# Patient Record
Sex: FEMALE | Race: WHITE | Employment: OTHER | URBAN - METROPOLITAN AREA
[De-identification: names, ages, dates, MRNs, and addresses within clinical notes are randomized per-mention and may not be internally consistent; named-entity substitution may affect disease eponyms.]

---

## 2017-01-01 ENCOUNTER — HOSPITAL ENCOUNTER (OUTPATIENT)
Dept: CARDIAC REHAB | Facility: CLINIC | Age: 72
End: 2017-01-19
Attending: INTERNAL MEDICINE
Payer: MEDICARE

## 2017-01-01 ENCOUNTER — HOSPITAL ENCOUNTER (OUTPATIENT)
Dept: CARDIAC REHAB | Facility: CLINIC | Age: 72
End: 2017-02-14
Attending: INTERNAL MEDICINE
Payer: MEDICARE

## 2017-01-01 ENCOUNTER — HOSPITAL ENCOUNTER (OUTPATIENT)
Dept: CARDIAC REHAB | Facility: CLINIC | Age: 72
End: 2017-01-10
Attending: INTERNAL MEDICINE
Payer: MEDICARE

## 2017-01-01 ENCOUNTER — OFFICE VISIT (OUTPATIENT)
Dept: CARDIOLOGY | Facility: CLINIC | Age: 72
End: 2017-01-01
Payer: COMMERCIAL

## 2017-01-01 ENCOUNTER — HOSPITAL ENCOUNTER (OUTPATIENT)
Dept: CARDIAC REHAB | Facility: CLINIC | Age: 72
End: 2017-02-07
Attending: INTERNAL MEDICINE
Payer: MEDICARE

## 2017-01-01 ENCOUNTER — TRANSFERRED RECORDS (OUTPATIENT)
Dept: HEALTH INFORMATION MANAGEMENT | Facility: CLINIC | Age: 72
End: 2017-01-01

## 2017-01-01 ENCOUNTER — HOSPITAL ENCOUNTER (OUTPATIENT)
Dept: CARDIAC REHAB | Facility: CLINIC | Age: 72
End: 2017-01-17
Attending: INTERNAL MEDICINE
Payer: MEDICARE

## 2017-01-01 ENCOUNTER — PRE VISIT (OUTPATIENT)
Dept: CARDIOLOGY | Facility: CLINIC | Age: 72
End: 2017-01-01

## 2017-01-01 ENCOUNTER — HOSPITAL ENCOUNTER (OUTPATIENT)
Dept: CARDIAC REHAB | Facility: CLINIC | Age: 72
End: 2017-02-03
Attending: INTERNAL MEDICINE
Payer: MEDICARE

## 2017-01-01 ENCOUNTER — HOSPITAL ENCOUNTER (OUTPATIENT)
Dept: CARDIAC REHAB | Facility: CLINIC | Age: 72
End: 2017-02-09
Attending: INTERNAL MEDICINE
Payer: MEDICARE

## 2017-01-01 ENCOUNTER — HOSPITAL ENCOUNTER (OUTPATIENT)
Dept: CARDIAC REHAB | Facility: CLINIC | Age: 72
End: 2017-03-13
Attending: INTERNAL MEDICINE
Payer: MEDICARE

## 2017-01-01 ENCOUNTER — HOSPITAL ENCOUNTER (OUTPATIENT)
Dept: CARDIAC REHAB | Facility: CLINIC | Age: 72
End: 2017-02-21
Attending: INTERNAL MEDICINE
Payer: MEDICARE

## 2017-01-01 ENCOUNTER — HOSPITAL ENCOUNTER (OUTPATIENT)
Dept: CARDIAC REHAB | Facility: CLINIC | Age: 72
End: 2017-03-02
Attending: INTERNAL MEDICINE
Payer: MEDICARE

## 2017-01-01 ENCOUNTER — HOSPITAL ENCOUNTER (OUTPATIENT)
Dept: CARDIAC REHAB | Facility: CLINIC | Age: 72
End: 2017-01-26
Attending: INTERNAL MEDICINE
Payer: MEDICARE

## 2017-01-01 ENCOUNTER — HOSPITAL ENCOUNTER (OUTPATIENT)
Dept: CARDIAC REHAB | Facility: CLINIC | Age: 72
End: 2017-02-16
Attending: INTERNAL MEDICINE
Payer: MEDICARE

## 2017-01-01 ENCOUNTER — HOSPITAL ENCOUNTER (OUTPATIENT)
Dept: CARDIAC REHAB | Facility: CLINIC | Age: 72
End: 2017-02-23
Attending: INTERNAL MEDICINE
Payer: MEDICARE

## 2017-01-01 ENCOUNTER — HOSPITAL ENCOUNTER (OUTPATIENT)
Dept: CARDIAC REHAB | Facility: CLINIC | Age: 72
End: 2017-01-30
Attending: INTERNAL MEDICINE
Payer: MEDICARE

## 2017-01-01 ENCOUNTER — HOSPITAL ENCOUNTER (OUTPATIENT)
Dept: CARDIAC REHAB | Facility: CLINIC | Age: 72
End: 2017-01-12
Attending: INTERNAL MEDICINE
Payer: MEDICARE

## 2017-01-01 VITALS
DIASTOLIC BLOOD PRESSURE: 80 MMHG | BODY MASS INDEX: 46.42 KG/M2 | SYSTOLIC BLOOD PRESSURE: 128 MMHG | HEART RATE: 54 BPM | HEIGHT: 65 IN | WEIGHT: 278.6 LBS

## 2017-01-01 VITALS — BODY MASS INDEX: 45.93 KG/M2 | WEIGHT: 276 LBS

## 2017-01-01 VITALS — WEIGHT: 276 LBS | BODY MASS INDEX: 45.93 KG/M2

## 2017-01-01 VITALS — BODY MASS INDEX: 45.82 KG/M2 | HEIGHT: 65 IN | WEIGHT: 275 LBS

## 2017-01-01 VITALS — BODY MASS INDEX: 46.1 KG/M2 | WEIGHT: 277 LBS

## 2017-01-01 VITALS — BODY MASS INDEX: 46.26 KG/M2 | WEIGHT: 278 LBS

## 2017-01-01 VITALS — BODY MASS INDEX: 45.98 KG/M2 | WEIGHT: 276 LBS | HEIGHT: 65 IN

## 2017-01-01 VITALS — BODY MASS INDEX: 46.32 KG/M2 | HEIGHT: 65 IN | WEIGHT: 278 LBS

## 2017-01-01 VITALS — BODY MASS INDEX: 45.6 KG/M2 | WEIGHT: 274 LBS

## 2017-01-01 VITALS — WEIGHT: 273 LBS | BODY MASS INDEX: 45.43 KG/M2

## 2017-01-01 DIAGNOSIS — R00.1 BRADYCARDIA: Primary | ICD-10-CM

## 2017-01-01 PROCEDURE — G0424 PULMONARY REHAB W EXER: HCPCS

## 2017-01-01 PROCEDURE — 40000244 ZZH STATISTIC VISIT PULM REHAB

## 2017-01-01 PROCEDURE — G0424 PULMONARY REHAB W EXER: HCPCS | Performed by: REHABILITATION PRACTITIONER

## 2017-01-01 PROCEDURE — G0239 OTH RESP PROC, GROUP: HCPCS

## 2017-01-01 PROCEDURE — 40000244 ZZH STATISTIC VISIT PULM REHAB: Performed by: REHABILITATION PRACTITIONER

## 2017-01-01 PROCEDURE — 99213 OFFICE O/P EST LOW 20 MIN: CPT | Performed by: INTERNAL MEDICINE

## 2017-01-01 RX ORDER — BUPROPION HYDROCHLORIDE 300 MG/1
300 TABLET ORAL EVERY MORNING
COMMUNITY

## 2017-01-01 RX ORDER — OXYBUTYNIN CHLORIDE 10 MG/1
10 TABLET, EXTENDED RELEASE ORAL DAILY
COMMUNITY

## 2017-01-01 ASSESSMENT — 6 MINUTE WALK TEST (6MWT)
FEMALE CALC: 311.91
TOTAL DISTANCE WALKED: 227.74
TOTAL DISTANCE WALKED: 227.74
MALE CALC: 356.97
GENDER SELECTION: FEMALE
FEMALE CALC: 309.83
FEMALE CALC: 312.95
TOTAL DISTANCE WALKED: 227.74
GENDER SELECTION: FEMALE
MALE CALC: 359.37
GENDER SELECTION: FEMALE
TOTAL DISTANCE WALKED: 255.48
MALE CALC: 358.57

## 2017-01-01 ASSESSMENT — PULMONARY FUNCTION TESTS
FEV1 (%PREDICTED): 53
FVC: 1.77
FEV1/FVC: 76
FEV1 (%PREDICTED): 53
FVC: 1.77
FVC_PERCENT_PREDICTED: 58
FVC: 1.77
FEV1: 1.22
FEV1 (%PREDICTED): 53

## 2017-01-01 ASSESSMENT — ACTIVITIES OF DAILY LIVING (ADL)
ADL_LIMITATIONS: DRESSING;BATHING

## 2017-01-03 ENCOUNTER — HOSPITAL ENCOUNTER (OUTPATIENT)
Dept: CARDIAC REHAB | Facility: CLINIC | Age: 72
End: 2017-01-03
Attending: INTERNAL MEDICINE
Payer: MEDICARE

## 2017-01-03 VITALS — BODY MASS INDEX: 45.26 KG/M2 | WEIGHT: 272 LBS

## 2017-01-03 PROCEDURE — 40000244 ZZH STATISTIC VISIT PULM REHAB

## 2017-01-03 PROCEDURE — G0424 PULMONARY REHAB W EXER: HCPCS

## 2017-01-03 ASSESSMENT — PULMONARY FUNCTION TESTS
FEV1: 1.22
FVC: 1.77
FEV1 (%PREDICTED): 53
FVC_PERCENT_PREDICTED: 58
FEV1/FVC: 76

## 2017-01-03 ASSESSMENT — 6 MINUTE WALK TEST (6MWT)
TOTAL DISTANCE WALKED: 227.74
GENDER SELECTION: FEMALE

## 2017-01-03 ASSESSMENT — ACTIVITIES OF DAILY LIVING (ADL): ADL_LIMITATIONS: DRESSING;BATHING

## 2017-01-03 NOTE — PROGRESS NOTES
Pat CAMILA Tera  1945  71 year old   01/03/17 1100   Session   Session 30 Day Individualized Treatment Plan  (ITP forwarded for medical director review. Pt started 12/26)   Certified through this date 02/03/17   Type Reassessment   General Information   Treatment Diagnosis COPD   Secondary Treatment Diagnosis Lung Cancer   Classification of COPD Stage 2 (Moderate)   Onset Date 12/26/13   Outpatient Pulmonary Rehab Start Date 12/26/16   Primary Physician Sommer He MD   Pulmonolgist MELI Spaulding MD   Medical History/Comorbidities   Medical History/Comorbidities Hypertension;GERD;Obstructive sleep apnea;Depression;Anxiety   Medical History Comments .m   Untoward Events/Exacerbations/Hospitalizations   Untoward Events/Exacerbations/Hospitalizations None   Sputum   Sputum Production Amount AM post nasal   Sputum Production Color Clear;Yellow   Sputum Production Consistency Thick   Tobacco History   Tobacco Former smoker   Tobacco Habit Cigarettes   Years Smoked 25   Average Packs Per Day 2.5   Quit Date or Planned Quit Date 01/01/87   Interventions Planned None: Patient is in maintenance   Medications   Long-Acting Beta Agonist Prescribed, taking as prescribed  (Anoro Ellipta (Trial))   Short-Acting Beta Agonist Prescribed, taking as prescribed  (Albuterol)   Long-Acting Anticholinergic Prescribed, taking as prescribed  (Anora Ellipta)   Short-Acting Anticholinergic Not prescribed   Inhaled Corticosteroid Not prescribed   Oral Corticosteroid Not prescribed   Pain   Patient Currently in Pain Yes   Pain Location Right knee and left ankle   Pain Rating 3/10   Pain Description Sharp   Falls Screen   Have you fallen two or more times in the past year? Yes   Have you fallen and had an injury in the past year? Yes  (right shoulder and knee)   Comment uses cane   Living and Work Status   Living Arrangements [facility]   Support System Live alone   Environmental Factors No concerns   ADL Limitations Dressing;Bathing  "  Occupation caregiver   Return to Employment No   Physical Assessments   Edema +1 Trace   Left Lung Sounds diminished   Right Lung Sounds diminished   Pulmonary Function Test (PFTs)   PFT Results Available   Date Completed 12/07/16   FVC Actual 1.77   % Predicted FVC 58   FEV1 Actual 1.22   % Predicted FEV1 53   FEV1/FEV Ratio 76   FRC Actual 69   Uncorrected DLCO 12.5   % Predicted Uncorrected DLCO 47   Pre/Post Bronchodilator Pre   Airway Obstruction Moderate   Individualized Treatment Plan   Sessions Scheduled 18   Oxygen Use   Supplemental Oxygen Needed No   Exercise Prescription   Mode Ambulation;Weights;Nustep   Frequency 2 days/week   Duration/Time Intermittent bouts   THR (85% of age predicted max heart rate)  125.8   Effort Rating (0-10) 4-7/10   Progression of Exercise Start with bouts of exercise up to 15\" then add 0.25 mets/week   Exercise Assessment   6 Minute Walk Predicted - Gender Selection Female   6 Minute Walk Distance (Initial) 227.74 Meters   Resting HR 53   Resting /60   Exercise /90   SpO2 RA   Exercise SpO2 91   Exercise Tolerance poor   Normal Limits Discussed No   Current Symptoms at Home Dyspnea;Fatigue;Joint pain   Nutrition Management   Age 71   Assessment Overweight   Goal weight (not discussed,  would like her to lose wt)   Interventions Planned Attend group nutrition class   Psychosocial   Initial Patient Health Questionnaire -9 (PHQ-9) for depression. To notify physician if pre-score >9. 12   Initial [facility] Survey score. A quality of life measure. To re evaluate if total score is > 25. Also consider PHQ-9 and DASI to determine if patient should be referred back to physician. 28   Initial Shortness of Breath Questionnaire (SOBQ) score. The goal is to reduce the score pre to post program. 68   Intervention Planned Patient to identify 2-3 coping mechanisms to decrease stress and anxiety;Patient to attend appropriate education class;Develop and implement coping " techniques;Recognize signs and symptoms of depression;Introduce relaxation techniques to assist with decreased anxiety;Use breathing techniques to control dyspnea cycle   Psychosocial Comments 12/26/16 Pt is on Prozac and feels well managed   Stages of Change   Aerobic Exercise Preparation   Physical Activity Preparation   Recommended diet Preparation   Stress Preparation   Smoking Cessation Maintenance   Oxygen Usage NA   Current Home Exercise   Type of Exercise None   Recommended Home Exercise Prescription   Type of Exercise Walking   Frequency (Days per week) 1-2   Duration (minutes per session) Intermittent   Effort Rating Recommended (0-10) Scale  4-6/10   30 Day Exercise Plan 12/26/16 Pt does not have any idea what she can do at home for exercise.   Learning Assessment   Learner Patient   Primary Language English   Preferred Learning Style Listening;Reading;Demonstration   Barriers to Learning No barriers noted   Patient Education/Referrals   Education Recommended Activities of Daily Living;Air Quality;Airway Clearance;Anatomy and Disease Process;Breathing Techniques;Community Resources/Exacerbation Management;Emotional Aspects of CLD;Energy Conservation;Exercise Principles;Medication Overview;Nutrition;Panic and Anxiety   Pulmonary Rehab Goals   Pulmonary Rehab Goals 1;2;3   Goal 1   Goal Pt to walk further with out stopping for resp symptoms by starting an appropriate exercise program that she can also start in the home enviroment.    Target Date 02/26/17   Goal 2   Goal Pt to learn more about her respiratory condition by attending all of the education offered.   Target Date 02/26/17   Goal 3   Goal Pt to learn breathing techniques to better manage her SOB with ADL's.   Target Date 02/26/17   Assessment   Assessment Pt is a 71 year old  with onset of dyspnea worse 3 months ago and currently assessed by a pulmonologist and found to have Stage II COPD. Also has a hx of squamous cell CA with LL lobectomy with  ongoing surveillance. EF of 55-60% and no CAD. Pt wears a CPAP for her NICOLE.She would like to be able to walk further and gain endurance by attending SD.   .Physician cosignature/electronic signature indicates approval of this ITP document. I have established, reviewed and made necessary changes to the individualized treatment plan and exercise prescription for this patient.

## 2017-01-05 ENCOUNTER — HOSPITAL ENCOUNTER (OUTPATIENT)
Dept: CARDIAC REHAB | Facility: CLINIC | Age: 72
End: 2017-01-05
Attending: INTERNAL MEDICINE
Payer: MEDICARE

## 2017-01-05 VITALS — WEIGHT: 271 LBS | BODY MASS INDEX: 45.1 KG/M2

## 2017-01-05 PROCEDURE — 40000244 ZZH STATISTIC VISIT PULM REHAB: Performed by: REHABILITATION PRACTITIONER

## 2017-01-05 PROCEDURE — G0424 PULMONARY REHAB W EXER: HCPCS | Performed by: REHABILITATION PRACTITIONER

## 2017-01-05 NOTE — PROGRESS NOTES
01/05/17 1000   Sessions   Session number 3   Sessions Scheduled 18   Resting Vital Signs   Resting Heart Rate 56   Resting /70 mmHg   Dyspnea at Rest? no   SpO2 95   FiO2 RA   Weight 122.925 kg (271 lb)   Exercise Vital Signs   Modalities Ambulation   HR 93   SPO2 92   FiO2 RA   Effort Rating 0-10 5.5   Shortness of Breath Rating 0-10 5   Pain Assessment   Does patient have pain? No   Exercise Modalities   Quick Picks Ambulation;Nu Step Q;Treadmill;Weights   Treadmill   Duration 13 Minutes   Speed 0.8   Grade 0   Workload (METS) 1.61   Weights   Upper Extremity: (pounds/reps) 1/10   Lower Extremity: (pounds/reps) 1/10   Nustep   Exercise Duration 15   Resistance/Level 2   Workload (METS) 1.7   Ambulation   Duration (Minutes) 5   Cool Down   Duration 5   Assessment   Current Symptoms in Rehab Dyspnea;Fatigue   Current Complaints/Pain Dyspnea, fatigue   Assessment / Progress Pt completed TUG test today for assessment of balance. She also attended class on pyschosocial aspects of CLB and living will.    Tolerance Exercise goals met today   Plan Increase time on TM to 15 mins (taking breaks as needed).    Comments/1:1 Intervention 1:1 face time with Dr. Cruz   Supervising Physician Dr. Cruz   Total Session Time (Minutes) 60 min group exercise including rest breaks in between modalities, 60 min education on psychosocial aspects of CLD.    I have personally seen this patient today. The patient reports not eating good breakfasts. The patient is currently participating in Pulmonary Rehabilitation, and demonstrates stable pulmonary response to exercise, making appropriate progress toward goals.  Barriers to progress are ankle and weight issues. I agree with this individual treatment plan and exercise prescription  See individual treatment plan for details of progress and plan.

## 2017-01-26 NOTE — PROGRESS NOTES
Pat Haley  1945  71 year old   01/26/17 1400   Session   Session 60 Day Individualized Treatment Plan   Certified through this date 03/04/17   Type Reassessment   General Information   Treatment Diagnosis COPD   Secondary Treatment Diagnosis Lung Cancer   Classification of COPD Stage 2 (Moderate)   Onset Date 12/26/13   Current Signs and Symptoms Fatigue   Outpatient Pulmonary Rehab Start Date 12/26/16   Primary Physician Sommer He MD   Pulmonolgist MELI Spaulding MD   Medical History/Comorbidities   Medical History/Comorbidities Hypertension;GERD;Obstructive sleep apnea;Depression;Anxiety   Medical History Comments .m   Untoward Events/Exacerbations/Hospitalizations   Untoward Events/Exacerbations/Hospitalizations None   Sputum   Sputum Production Amount AM post nasal   Sputum Production Color Clear;Yellow   Sputum Production Consistency Thick   Tobacco History   Tobacco Former smoker   Tobacco Habit Cigarettes   Years Smoked 25   Average Packs Per Day 2.5   Quit Date or Planned Quit Date 01/01/87   Interventions Planned None: Patient is in maintenance   Medications   Long-Acting Beta Agonist Prescribed, taking as prescribed  (Anoro Ellipta (Trial))   Short-Acting Beta Agonist Prescribed, taking as prescribed  (Albuterol)   Long-Acting Anticholinergic Prescribed, taking as prescribed  (Anora Ellipta)   Short-Acting Anticholinergic Not prescribed   Inhaled Corticosteroid Not prescribed   Oral Corticosteroid Not prescribed   Medications Reconciled By Patient   Preventative Vaccinations   Influenza Vaccination Yes   Pneumonia Vaccination Yes   Pain   Patient Currently in Pain No   Falls Screen   Have you fallen two or more times in the past year? Yes   Have you fallen and had an injury in the past year? Yes  (right shoulder and knee)   Referral initiated to physical therapy No   Comment 1/26 Pt now stable with use of her cane. Referral to PT not needed at this time.    Living and Work Status   Living  "Arrangements [facility]   Support System Live alone   Environmental Factors No concerns   ADL Limitations Dressing;Bathing   Occupation caregiver   Return to Employment No   Physical Assessments   Incisions Not applicable   Edema +1 Trace   Left Lung Sounds diminished   Right Lung Sounds diminished   Pulmonary Function Test (PFTs)   PFT Results Available   Date Completed 12/07/16   FVC Actual 1.77   % Predicted FVC 58   FEV1 Actual 1.22   % Predicted FEV1 53   FEV1/FEV Ratio 76   FRC Actual 69   Uncorrected DLCO 12.5   % Predicted Uncorrected DLCO 47   Pre/Post Bronchodilator Pre   Airway Obstruction Moderate   Individualized Treatment Plan   Sessions Scheduled 18   Oxygen Use   Supplemental Oxygen Needed No   Interventions Recommended NA   Interventions Completed NA   Exercise Prescription   Mode Ambulation;Weights;Nustep   Frequency 2 days/week   Duration/Time Intermittent bouts;15-30 min   THR (85% of age predicted max heart rate)  125.8   Effort Rating (0-10) 4-7/10   Progression of Exercise Start with bouts of exercise up to 15\" then add 0.25 mets/week 1/26 Continue with 30 min total of exercise and continuous bouts on nustep and TM. Pt to complete clinic walking for warmup. Increase 1/4 MET every 1-2 weeks as Pt is able to tolerate with O2 sats WNL and good tolerance. Increase weights as Pt is able to tolerate with proper form and breathing techniques.    Oxygen Titration with Exercise NA   Exercise Assessment   6 Minute Walk Predicted - Gender Selection Female   6 Minute Walk Predicted (Female) 311.91   6 Minute Walk Predicted (Male) 358.57   6 Minute Walk Distance (Initial) 227.74 Meters   Resting HR 53   Exercise HR 72   Resting /76   SpO2 94   Exercise SpO2 88   Current MET level 2.2   Exercise Tolerance poor   Normal Limits Discussed Yes   Current Symptoms at Home Fatigue;Dyspnea   Current Symptoms in Rehab Fatigue;Dyspnea   Limitations None noted   Nutrition Management   Age 71   Height 1.651 m (5' " "5\")   Weight 125.193 kg (276 lb)   BMI (Calculated) 46.02   Assessment Overweight   Goal weight (not discussed, dr would like her to lose wt)   Interventions Planned Attend group nutrition class;Dietician Consult   Interventions Completed Educated on weight loss principles   Psychosocial   Initial Patient Health Questionnaire -9 (PHQ-9) for depression. To notify physician if pre-score >9. 12   Initial [facility] Survey score. A quality of life measure. To re evaluate if total score is > 25. Also consider PHQ-9 and DASI to determine if patient should be referred back to physician. 28   Initial Shortness of Breath Questionnaire (SOBQ) score. The goal is to reduce the score pre to post program. 68   Intervention Planned Patient to identify 2-3 coping mechanisms to decrease stress and anxiety;Patient to attend appropriate education class;Develop and implement coping techniques;Recognize signs and symptoms of depression;Introduce relaxation techniques to assist with decreased anxiety;Use breathing techniques to control dyspnea cycle   Interventions Completed Introduced to Relaxation Techniques to assist with decreased anxiety;Demonstrated ability to apply breathing techniques to control dyspnea cycle   Psychosocial Comments 12/26/16 Pt is on Prozac and feels well managed. 1/26 Pt admits to having done counseling in the past. She feels there is a lot going on with her personal life that is causing her stress. Discussed reintroducing realxation techniques into her routine at home. She used this in the past. Gave Pt hadnout today on guided deep breathing and muscle relaxation techniques and started to practie these with her today. She is going to try this over the weekend. Pt is planning to attend group psychosocial class with Desiree and will consider talking with someone again. She will let us know if interested.    Stages of Change   Aerobic Exercise Contemplation   Physical Activity Contemplation   Recommended diet " Contemplation   Stress Preparation   Smoking Cessation Maintenance   Oxygen Usage NA   Current Home Exercise   Type of Exercise None   Recommended Home Exercise Prescription   Type of Exercise Walking   Frequency (Days per week) 1-2   Duration (minutes per session) Intermittent   Effort Rating Recommended (0-10) Scale  4-6/10   30 Day Exercise Plan 12/26/16 Pt does not have any idea what she can do at home for exercise.1/26 Discussed increasing physical activity at home with Pt by setting goals for her personal life. Suggested to Pt to make a goal list for each day of what shew ould like to accomplish so she has planned activity to complete. Pt has difficulty now motivating herself to do movement. Pt to initiate 3 lifestyle activity goals each day to increase her overall activity level at home and continue coming to [place] 2 days per week for aerobic exercise.    Learning Assessment   Learner Patient   Primary Language English   Preferred Learning Style Listening;Reading;Demonstration   Barriers to Learning No barriers noted   Patient Education/Referrals   Education Recommended Activities of Daily Living;Air Quality;Airway Clearance;Anatomy and Disease Process;Breathing Techniques;Community Resources/Exacerbation Management;Emotional Aspects of CLD;Energy Conservation;Exercise Principles;Medication Overview;Nutrition;Panic and Anxiety   Education Attended Anatomy and Disease Process;Exercise Principles;Community Resources/Exacerbation Management   Referral(s) Recommended Chaplaincy;Dietician  (1/26 Pt will think about whether she wants to initiate this)   Follow-up/On-going Support   Provider follow-up needed on the following No follow-up needed   Pulmonary Rehab Goals   Pulmonary Rehab Goals 1;2;3   Goal 1   Goal Pt to walk further with out stopping for resp symptoms by starting an appropriate exercise program that she can also start in the home enviroment.    Target Date 02/26/17   Progress Towards Goal 1/26 Pt is  tolerating walking better now after coming to rehab for approximately a month. She is still not moving much at home. Discussed adding in more lifestyle activity at home by making 3 lifestyle activity goals for the day. Pt states she wants to start with this.    Goal 2   Goal Pt to learn more about her respiratory condition by attending all of the education offered.   Target Date 02/26/17   Progress Towards Goal 1/26 Pt has attended 3 classes thus far one including anatomy and disease process of  the lungs. Pt feels classes have been beneficial thus far and plans to keeping attending educational opportunities offerred through [place].    Goal 3   Goal Pt to learn breathing techniques to better manage her SOB with ADL's.   Target Date 02/26/17   Date Met 01/26/17   Progress Towards Goal 1/26 Pt introduced to PLB techniques and has practiced them 1:1 with therapist while in [place]. She is using these techniques at home when she feels SOB. Goal has been met.    Assessment   Assessment Pt is a 71 year old  with onset of dyspnea worse 3 months ago and currently assessed by a pulmonologist and found to have Stage II COPD. Also has a hx of squamous cell CA with LL lobectomy with ongoing surveillance. EF of 55-60% and no CAD. Pt wears a CPAP for her NICOLE.She would like to be able to walk further and gain endurance by attending [place]. 1/26 ITP completed today. Pt is struggling with personal stress at home. Spent the majority of consult today discussing stress management and suggesting to her that she take measures to have her son taken out of her home in order to focus on her own health. Pt states her son has been in and out of senior care. Discussed option of chaplaincy 1:1 consult through [place] and she will thinka bout doing this. Also discussed with Pt weight loss principles and suggested doing a 3 day food log and 1:1 dietary referral for weight management. Pt also wants to think about this before taking action. Losing weight and managing  stress is important to her, but she is still in contemplation. Pt does feel she is gaining benefit from attending [facility]. Pt to continue [facility] for safe progression of exercise, support, and education regarding management of lung disease.    .Physician cosignature/electronic signature indicates approval of this ITP document. I have established, reviewed and made necessary changes to the individualized treatment plan and exercise prescription for this patient.

## 2017-02-03 NOTE — PROGRESS NOTES
Patjaiden Burchgayathri  71 year old  I have personally seen this patient today. The patient reports that she is very inactive at home and does not think her exercise sessions have changed how she does her ADL's.  The patient is currently participating in Pulmonary Rehabilitation, and demonstrates stable pulmonary response to exercise, making appropriate progress toward goals.  Barriers to progress are inactivity, shoulder and knee pain. I agree with this individual treatment plan and exercise prescription  See individual treatment plan for details of progress and plan.   02/03/17 1500   Sessions   Session number 10   Sessions Scheduled 18   Resting Vital Signs   Resting Heart Rate 59   Resting /60 mmHg   Dyspnea at Rest? No   SpO2 96   FiO2 RA   Weight 125.193 kg (276 lb)   Exercise Vital Signs   Modalities Nu Step Q   HR 69   SPO2 90   FiO2 RA   Effort Rating 0-10 5   Shortness of Breath Rating 0-10 4   Pain Assessment   Does patient have pain? No   Treadmill   Duration 15 Minutes   Speed 1   Grade 1   Workload (METS) 1.9   Weights   Upper Extremity: (pounds/reps) 1/10   Lower Extremity: (pounds/reps) 1/10   Nustep   Exercise Duration 15   Resistance/Level 3   Workload (METS) 2.2   Ambulation   Duration (Minutes) 5   Cool Down   Duration 5   Assessment   Current Symptoms in Rehab Dyspnea;Fatigue   Current Complaints/Pain Fatigued today   Assessment / Progress Pt get tired with exercise   Tolerance Exercise goals met today   Plan Repeat   Comments/1:1 Intervention 1:1 F to F time with Dr Cruz for 30 day MD rounding   Supervising Physician Dr Cruz   Total Session Time (Minutes) 60 min group exercise including rest breaks in between modalities

## 2017-02-14 NOTE — PROGRESS NOTES
Pat Burchgayathri  1945  72 year old   02/14/17 1300   Session   Session 90 Day Individualized Treatment Plan   Certified through this date 03/31/17   Type Reassessment   General Information   Treatment Diagnosis COPD   Secondary Treatment Diagnosis Lung Cancer   Classification of COPD Stage 2 (Moderate)   Onset Date 12/26/13   Current Signs and Symptoms Fatigue   Outpatient Pulmonary Rehab Start Date 12/26/16   Primary Physician Sommer He MD   Pulmonolgist MELI Spaulding MD   General Information Comments 2/14 Pt will be making an appt with pulmonologist in early March.    Medical History/Comorbidities   Medical History/Comorbidities Hypertension;GERD;Obstructive sleep apnea;Depression;Anxiety   Medical History Comments .m   Untoward Events/Exacerbations/Hospitalizations   Untoward Events/Exacerbations/Hospitalizations None   Sputum   Sputum Production Amount AM post nasal   Sputum Production Color Clear;Yellow   Sputum Production Consistency Thick   Tobacco History   Tobacco Former smoker   Tobacco Habit Cigarettes   Years Smoked 25   Average Packs Per Day 2.5   Quit Date or Planned Quit Date 01/01/87   Interventions Planned None: Patient is in maintenance   Medications   Long-Acting Beta Agonist Prescribed, taking as prescribed  (Anoro Ellipta (Trial))   Short-Acting Beta Agonist Prescribed, taking as prescribed  (Albuterol)   Long-Acting Anticholinergic Prescribed, taking as prescribed  (Anora Ellipta)   Short-Acting Anticholinergic Not prescribed   Inhaled Corticosteroid Not prescribed   Oral Corticosteroid Not prescribed   Medications Reconciled By Patient   Preventative Vaccinations   Influenza Vaccination Yes   Pneumonia Vaccination Yes   Pain   Patient Currently in Pain No   Falls Screen   Have you fallen two or more times in the past year? Yes   Have you fallen and had an injury in the past year? Yes  (right shoulder and knee)   Referral initiated to physical therapy No   Comment 1/26 Pt now stable with  "use of her cane. Referral to PT not needed at this time.    Living and Work Status   Living Arrangements [facility] Live alone   Environmental Factors No concerns   ADL Limitations Dressing;Bathing   Occupation caregiver   Return to Employment No   Physical Assessments   Incisions Not applicable   Edema +1 Trace   Left Lung Sounds diminished   Right Lung Sounds diminished   Pulmonary Function Test (PFTs)   PFT Results Available   Date Completed 12/07/16   FVC Actual 1.77   % Predicted FVC 58   FEV1 Actual 1.22   % Predicted FEV1 53   FEV1/FEV Ratio 76   FRC Actual 69   Uncorrected DLCO 12.5   % Predicted Uncorrected DLCO 47   Pre/Post Bronchodilator Pre   Airway Obstruction Moderate   Individualized Treatment Plan   Sessions Scheduled 18   Sessions Attended 14   Type Aerobic exercise;Resistance training;Flexibility training   Oxygen Use   Supplemental Oxygen Needed No   Interventions Recommended NA   Interventions Completed NA   Exercise Prescription   Mode Ambulation;Weights;Nustep;Treadmill   Frequency 2 days/week   Duration/Time 15-30 min   THR (85% of age predicted max heart rate)  125.8   Effort Rating (0-10) 4-7/10   Progression of Exercise Start with bouts of exercise up to 15\" then add 0.25 mets/week 1/26 Continue with 30 min total of exercise and continuous bouts on nustep and TM. Pt to complete clinic walking for warmup. Increase 1/4 MET every 1-2 weeks as Pt is able to tolerate with O2 sats WNL and good tolerance. Increase weights as Pt is able to tolerate with proper form and breathing techniques. 2/14 Continue with current progression. Discharge from Pulmonary rehab early March.   Oxygen Titration with Exercise NA   Exercise Assessment   6 Minute Walk Predicted - Gender Selection Female   6 Minute Walk Predicted (Female) 312.95   6 Minute Walk Predicted (Male) 359.37   6 Minute Walk Distance (Initial) 227.74 Meters   Resting HR 59   Exercise HR 65   Resting /62   SpO2 95   Exercise " "SpO2 93   Current MET level 2.2   Exercise Tolerance poor   Normal Limits Discussed Yes   Current Symptoms at Home Fatigue;Dyspnea   Current Symptoms in Rehab Fatigue;Dyspnea   Limitations None noted   Nutrition Management   Age 71   Height 1.651 m (5' 5\")   Weight 124.7 kg (275 lb)   BMI (Calculated) 45.86   Assessment Overweight   Goal weight (not discussed,  would like her to lose wt)   Interventions Planned Attend group nutrition class;Dietician Consult   Interventions Completed Educated on weight loss principles;Attended group nutrition class;Instructed on label reading   Psychosocial   Initial Patient Health Questionnaire -9 (PHQ-9) for depression. To notify physician if pre-score >9. 12   Initial [facility] Survey score. A quality of life measure. To re evaluate if total score is > 25. Also consider PHQ-9 and DASI to determine if patient should be referred back to physician. 28   Initial Shortness of Breath Questionnaire (SOBQ) score. The goal is to reduce the score pre to post program. 68   Intervention Planned Patient to identify 2-3 coping mechanisms to decrease stress and anxiety;Patient to attend appropriate education class;Develop and implement coping techniques;Recognize signs and symptoms of depression;Introduce relaxation techniques to assist with decreased anxiety;Use breathing techniques to control dyspnea cycle   Interventions Completed Introduced to Relaxation Techniques to assist with decreased anxiety;Demonstrated ability to apply breathing techniques to control dyspnea cycle   Psychosocial Comments 12/26/16 Pt is on Prozac and feels well managed. 1/26 Pt admits to having done counseling in the past. She feels there is a lot going on with her personal life that is causing her stress. Discussed reintroducing realxation techniques into her routine at home. She used this in the past. Gave Pt hadnout today on guided deep breathing and muscle relaxation techniques and started to practie these " with her today. She is going to try this over the weekend. Pt is planning to attend group psychosocial class with Desiree and will consider talking with someone again. She will let us know if interested. 2/14 Pt is more upbeat today. She states she has had more energy lately and feels the exercise is helping her mood. She has read through the deep breathing exercises given to her at last ITP and practiced these. She plans to attend class on anxiety / relaxation when it's offerred in the next 2 weeks.    Stages of Change   Aerobic Exercise Contemplation   Physical Activity Contemplation   Recommended diet Contemplation   Stress Action   Smoking Cessation Maintenance   Oxygen Usage NA   Current Home Exercise   Type of Exercise None   Recommended Home Exercise Prescription   Type of Exercise Walking   Frequency (Days per week) 1-2   Duration (minutes per session) Intermittent   Effort Rating Recommended (0-10) Scale  4-6/10   30 Day Exercise Plan 12/26/16 Pt does not have any idea what she can do at home for exercise.1/26 Discussed increasing physical activity at home with Pt by setting goals for her personal life. Suggested to Pt to make a goal list for each day of what shew ould like to accomplish so she has planned activity to complete. Pt has difficulty now motivating herself to do movement. Pt to initiate 3 lifestyle activity goals each day to increase her overall activity level at home and [facility] coming [facility] for [facility] exercise. 2/14 Pt to continuing writing goal lists and to initiate 5 min walks everyday outside as weather permits using her cane.    Learning Assessment   Learner Patient   Primary Language English   Preferred Learning Style Listening;Reading;Demonstration   Barriers to Learning No barriers noted   Patient Education/Referrals   Education Recommended Activities of Daily Living;Air Quality;Airway Clearance;Anatomy and Disease Process;Breathing Techniques;Community  Resources/Exacerbation Management;Emotional Aspects of CLD;Energy Conservation;Exercise Principles;Medication Overview;Nutrition;Panic and Anxiety   Education Attended Anatomy and Disease Process;Exercise Principles;Community Resources/Exacerbation Management   Referral(s) Recommended (2/14 Pt not ready to initiate referral to dietician/)   Follow-up/On-going Support   Provider follow-up needed on the following No follow-up needed   Pulmonary Rehab Goals   Pulmonary Rehab Goals 1;2;3   Goal 1   Goal Pt to walk further with out stopping for resp symptoms by starting an appropriate exercise program that she can also start in the home enviroment.    Target Date 02/26/17   Progress Towards Goal 1/26 Pt is tolerating walking better now after coming to rehab for approximately a month. She is still not moving much at home. Discussed adding in more lifestyle activity at home by making 3 lifestyle activity goals for the day. Pt states she wants to start with this. 2/14 Pt states she is starting to feel the difference in her energy levels now that she has been in [place] for a couple of months. She is able to do more at home and maintain her energy levels.    Goal 2   Goal Pt to learn more about her respiratory condition by attending all of the education offered.   Target Date 02/26/17   Progress Towards Goal 1/26 Pt has attended 3 classes thus far one including anatomy and disease process of  the lungs. Pt feels classes have been beneficial thus far and plans to keeping attending educational opportunities offerred through [place]. 2/14 Pt continues to attend education classes offered through [place] and is attending nutrition class offerred today in [place].    Goal 3   Goal Pt to learn breathing techniques to better manage her SOB with ADL's.   Target Date 02/26/17   Date Met 01/26/17   Progress Towards Goal 1/26 Pt introduced to PLB techniques and has practiced them 1:1 with therapist while in [place]. She is using these techniques at home  when she feels SOB. Goal has been met.    Assessment   Assessment Pt is a 71 year old  with onset of dyspnea worse 3 months ago and currently assessed by a pulmonologist and found to have Stage II COPD. Also has a hx of squamous cell CA with LL lobectomy with ongoing surveillance. EF of 55-60% and no CAD. Pt wears a CPAP for her NICOLE.She would like to be able to walk further and gain endurance by attending [facility]. 1/26 ITP completed today. Pt is struggling with personal stress at home. Spent the majority of consult today discussing stress management and suggesting to her that she take measures to have her son taken out of her home in order to focus on her own health. Pt states her son has been in and out of [facility]. Discussed option of chaplaincy 1:1 consult through [facility] and she will thinka bout doing this. Also discussed with Pt weight loss principles and suggested doing a 3 day food log and 1:1 dietary referral for weight management. Pt also wants to think about this before taking action. Losing weight and managing stress is important to her, but she is still in contemplation. Pt does feel she is gaining benefit from attending [facility]. Pt to continue [facility] for safe progression of exercise, support, and education regarding management of lung disease. 2/14 Pt has been plateauing her in progression for the last 3 weeks iN [facility], however, today she exceeded expectations and easily completed her workout. She feels mroe upbeat and has more energy. She is starting to become more organized at home which will help her manage her stress and also help with end of life planning. She discussed this today. Challenged Pt to start exercising now at home and discussed her options for continuing an exercise program upon discharge from [facility]. She is contemplating this and was introduced to the option of the WEL program today. Pt to continue [facility] for education on stress/anxiety management, progression of exercise and support.    .Physician  cosignature/electronic signature indicates approval of this ITP document. I have established, reviewed and made necessary changes to the individualized treatment plan and exercise prescription for this patient.

## 2017-03-02 NOTE — PROGRESS NOTES
03/02/17 1000   Sessions   Session number 17   Sessions Scheduled 18   Resting Vital Signs   Resting Heart Rate 57   Resting /64   Dyspnea at Rest? Yes   SpO2 95   FiO2 RA   Weight 125.2 kg (276 lb)   Exercise Vital Signs   Modalities Nu Step Q   HR 66   SPO2 95   FiO2 RA   Effort Rating 0-10 3.5   Shortness of Breath Rating 0-10 4   Pain Assessment   Does patient have pain? Yes   Intensity rating 5   Pain location sciatica   Treadmill   Duration 15 Minutes   Speed 1   Grade 1.5   Workload (METS) 1.97   Nustep   Exercise Duration 20   Resistance/Level 3   Workload (METS) 2.5   Assessment   Current Symptoms in Rehab Fatigue;Dyspnea  (sciatica today)   Current Complaints/Pain dyspnea, fatigue  (sciatica pain)   Assessment / Progress Pt. reports her sciatica hurt significantly. Pt. reports it started last week and isn't relieved by anything.   Tolerance Exercise goals not met today   Plan repeat exercise plan   Comments/1:1 Intervention 1:1 face to face time with Dr. Anthony Jones MD rounding   Supervising Physician Dr. Cruz   Total Session Time (Minutes) 60 minutes of group exercise   I have personally seen this patient today. I have met with this patient monthly and she needs to be held accountable for her exercise program for success.  Today we discussed dietary changes, calories in vs calories out and wt loss.  The patient is currently participating in Pulmonary Rehabilitation, and demonstrates stable pulmonary response to exercise, making appropriate progress toward goals.  Barriers to progress are motivation. agree with this individual treatment plan and exercise prescription  See individual treatment plan for details of progress and plan.

## 2017-03-13 NOTE — PROGRESS NOTES
Pat Haley  72 year old  1945  I have reviewed this patient s outcomes and self report of symptoms.  The patient has completed Pulmonary Rehabilitation and has made appropriate progress towards goals.  Please see individual treatment plan for details of attainment, discharge plan and independent exercise prescription. 03/13/17 1100   Session   Session Discharge Note   Type Discharge/Follow-up   General Information   Treatment Diagnosis COPD   Secondary Treatment Diagnosis Lung Cancer   Classification of COPD Stage 2 (Moderate)   Onset Date 12/26/13   Outpatient Pulmonary Rehab Start Date 12/26/16   Primary Physician Sommer He MD   Pulmonolgist MELI Spaulding MD   General Information Comments 2/14 Pt will be making an appt with pulmonologist in early March.    Medical History/Comorbidities   Medical History/Comorbidities Hypertension;GERD;Obstructive sleep apnea;Depression;Anxiety   Medical History Comments Past Medical History   Diagnosis Date     Chest tightness or pressure 10/19/2016     Depression      season affective disorder also     Gastro-oesophageal reflux disease      Hypertension      Incontinence of urine      Lung cancer (H)      Restless legs      Sleep apnea      UTI (lower urinary tract infection) 11/2014     sepsis with ecoli      Untoward Events/Exacerbations/Hospitalizations   Untoward Events/Exacerbations/Hospitalizations None   Sputum   Sputum Production Amount AM post nasal   Sputum Production Color Clear;Yellow   Sputum Production Consistency Thick   Tobacco History   Tobacco Former smoker   Tobacco Habit Cigarettes   Years Smoked 25   Average Packs Per Day 2.5   Quit Date or Planned Quit Date 01/01/87   Interventions Planned None: Patient is in maintenance   Medications   Long-Acting Beta Agonist Prescribed, taking as prescribed  (Anoro Ellipta (Trial))   Short-Acting Beta Agonist Prescribed, taking as prescribed  (Albuterol)   Long-Acting Anticholinergic Prescribed, taking as  "prescribed  (Anora Ellipta)   Short-Acting Anticholinergic Not prescribed   Inhaled Corticosteroid Not prescribed   Oral Corticosteroid Not prescribed   Medications Reconciled By Patient   Preventative Vaccinations   Influenza Vaccination Yes   Pneumonia Vaccination Yes   Pain   Patient Currently in Pain No   Falls Screen   Have you fallen two or more times in the past year? Yes   Have you fallen and had an injury in the past year? Yes  (right shoulder and knee)   Referral initiated to physical therapy No   Comment 1/26 Pt now stable with use of her cane. Referral to PT not needed at this time. 3/13/17 TUG test >13 but pt not interested in the balance clinic referral   Living and Work Status   Living Arrangements [facility]   [facility] Live alone   Environmental Factors No concerns   ADL Limitations Dressing;Bathing   Occupation caregiver   Return to Employment No   Physical Assessments   Incisions Not applicable   Edema +1 Trace   Left Lung Sounds diminished   Right Lung Sounds diminished   Pulmonary Function Test (PFTs)   PFT Results Available   Date Completed 12/07/16   FVC Actual 1.77   % Predicted FVC 58   FEV1 Actual 1.22   % Predicted FEV1 53   FEV1/FEV Ratio 76   FRC Actual 69   Uncorrected DLCO 12.5   % Predicted Uncorrected DLCO 47   Pre/Post Bronchodilator Pre   Airway Obstruction Moderate   Individualized Treatment Plan   Sessions Scheduled 18   Sessions Attended 18   Type Aerobic exercise   Oxygen Use   Supplemental Oxygen Needed No   Interventions Recommended NA   Interventions Completed NA   Exercise Prescription   Mode Ambulation;Weights;Nustep;Treadmill   Frequency 2 days/week   Duration/Time 15-30 min   THR (85% of age predicted max heart rate)  125.8   Effort Rating (0-10) 4-7/10   Progression of Exercise Start with bouts of exercise up to 15\" then add 0.25 mets/week 1/26 Continue with 30 min total of exercise and continuous bouts on nustep and TM. Pt to complete clinic walking for warmup. " "Increase 1/4 MET every 1-2 weeks as Pt is able to tolerate with O2 sats WNL and good tolerance. Increase weights as Pt is able to tolerate with proper form and breathing techniques. 2/14 Continue with current progression. Discharge from Pulmonary rehab early March.   Oxygen Titration with Exercise NA   Comments Pt will be joining the senior center for exercise.   Exercise Assessment   6 Minute Walk Predicted - Gender Selection Female   6 Minute Walk Predicted (Female) 309.83   6 Minute Walk Predicted (Male) 356.97   6 Minute Walk Distance (Initial) 227.74 Meters   6-min Walk Distance (Discharge) 255.48 Meters   Resting HR 59   Exercise HR 92   Resting /60   Exercise /65   SpO2 96   Exercise SpO2 88   Current MET level 2.4   Exercise Tolerance fair   Normal Limits Discussed Yes   Current Symptoms at Home Fatigue;Dyspnea   Current Symptoms in Rehab Fatigue;Dyspnea   Limitations None noted   Nutrition Management   Age 71   Height 1.651 m (5' 5\")   Weight 126.1 kg (278 lb)   BMI (Calculated) 46.36   Assessment Overweight   Goal weight (not discussed,  would like her to lose wt)   Interventions Planned Attend group nutrition class;Dietician Consult   Interventions Completed Educated on weight loss principles;Attended group nutrition class;Instructed on label reading   Psychosocial   Initial Patient Health Questionnaire -9 (PHQ-9) for depression. To notify physician if pre-score >9. 12   Initial [employer] COOP Survey score. A quality of life measure. To re evaluate if total score is > 25. Also consider PHQ-9 and DASI to determine if patient should be referred back to physician. 28   Initial Shortness of Breath Questionnaire (SOBQ) score. The goal is to reduce the score pre to post program. 68   Discharge PHQ-9 for depression 12   Discharge [employer] COOP Survey Score 24   Discharge SOBQ Score 58   Intervention Planned Patient to identify 2-3 coping mechanisms to decrease stress and anxiety;Patient to attend " appropriate education class;Develop and implement coping techniques;Recognize signs and symptoms of depression;Introduce relaxation techniques to assist with decreased anxiety;Use breathing techniques to control dyspnea cycle   Interventions Completed Introduced to Relaxation Techniques to assist with decreased anxiety;Demonstrated ability to apply breathing techniques to control dyspnea cycle;Identified 2-3 coping mechanisms for stress;Verbalized understanding of negative impact of stress to personal health   Psychosocial Comments 12/26/16 Pt is on Prozac and feels well managed. 1/26 Pt admits to having done counseling in the past. She feels there is a lot going on with her personal life that is causing her stress. Discussed reintroducing realxation techniques into her routine at home. She used this in the past. Gave Pt hadnout today on guided deep breathing and muscle relaxation techniques and started to practie these with her today. She is going to try this over the weekend. Pt is planning to attend group psychosocial class with Desiree and will consider talking with someone again. She will let us know if interested. 2/14 Pt is more upbeat today. She states she has had more energy lately and feels the exercise is helping her mood. She has read through the deep breathing exercises given to her at last ITP and practiced these. She plans to attend class on anxiety / relaxation when it's offerred in the next 2 weeks. 3/13/17 Pt states she knows what depression looks like and has been dealing with it for years. I'm doing good on the Prozac.   Stages of Change   Aerobic Exercise Contemplation   Physical Activity Contemplation   Recommended diet Contemplation   Stress Action   Smoking Cessation Maintenance   Oxygen Usage NA   Current Home Exercise   Type of Exercise None  (Joining the senior center tomorrow)   Recommended Home Exercise Prescription   Type of Exercise Walking   Frequency (Days per week) 1-2   Duration  (minutes per session) Intermittent   Effort Rating Recommended (0-10) Scale  4-6/10   Recommended Exercise Heart Rate Range (not given)   30 Day Exercise Plan 12/26/16 Pt does not have any idea what she can do at home for exercise.1/26 Discussed increasing physical activity at home with Pt by setting goals for her personal life. Suggested to Pt to make a goal list for each day of what shew ould like to accomplish so she has planned activity to complete. Pt has difficulty now motivating herself to do movement. Pt to initiate 3 lifestyle activity goals each day to increase her overall activity level at home and continue coming to [place] 2 days per week for aerobic exercise. 2/14 Pt to continuing writing goal lists and to initiate 5 min walks everyday outside as weather permits using her cane.    Learning Assessment   Learner Patient   Primary Language English   Preferred Learning Style Listening;Reading;Demonstration   Barriers to Learning No barriers noted   Patient Education/Referrals   Education Recommended Activities of Daily Living;Air Quality;Airway Clearance;Anatomy and Disease Process;Breathing Techniques;Community Resources/Exacerbation Management;Emotional Aspects of CLD;Energy Conservation;Exercise Principles;Medication Overview;Nutrition;Panic and Anxiety   Education Attended Anatomy and Disease Process;Exercise Principles;Community Resources/Exacerbation Management;Activities of Daily Living;Advance Directives;Air Quality;Airway Clearance;Breathing Techniques;Emotional Aspects of CLD;Energy Conservation;Home Oxygen Equipment;Medication Overview;Nutrition;Panic and Anxiety   Referral(s) Recommended (2/14 Pt not ready to initiate referral to dietician/)   Follow-up/On-going Support   Provider follow-up needed on the following No follow-up needed   Pulmonary Rehab Goals   Pulmonary Rehab Goals 1;2;3   Goal 1   Goal Pt to walk further with out stopping for resp symptoms by starting an appropriate exercise  program that she can also start in the home enviroment.    Target Date 02/26/17   Date Met 03/13/17   Progress Towards Goal 1/26 Pt is tolerating walking better now after coming to rehab for approximately a month. She is still not moving much at home. Discussed adding in more lifestyle activity at home by making 3 lifestyle activity goals for the day. Pt states she wants to start with this. 2/14 Pt states she is starting to feel the difference in her energy levels now that she has been in [place] for a couple of months. She is able to do more at home and maintain her energy levels. 3/13/17 She walked further on her 6 min walk today. Also indicated on her DASI that she could walk a block without stopping.   Goal 2   Goal Pt to learn more about her respiratory condition by attending all of the education offered.   Target Date 02/26/17   Date Met 03/13/17   Progress Towards Goal 1/26 Pt has attended 3 classes thus far one including anatomy and disease process of  the lungs. Pt feels classes have been beneficial thus far and plans to keeping attending educational opportunities offerred through [place]. 2/14 Pt continues to attend education classes offered through [place] and is attending nutrition class offerred today in [place]. 3/13/17 She attended all classes available.   Goal 3   Goal Pt to learn breathing techniques to better manage her SOB with ADL's.   Target Date 02/26/17   Date Met 01/26/17   Progress Towards Goal 1/26 Pt introduced to PLB techniques and has practiced them 1:1 with therapist while in [place]. She is using these techniques at home when she feels SOB. Goal has been met.    Assessment   Assessment Pt is a 71 year old  with onset of dyspnea worse 3 months ago and currently assessed by a pulmonologist and found to have Stage II COPD. Also has a hx of squamous cell CA with LL lobectomy with ongoing surveillance. EF of 55-60% and no CAD. Pt wears a CPAP for her NICOLE.She would like to be able to walk further and gain endurance  by attending [place]. 1/26 ITP completed today. Pt is struggling with personal stress at home. Spent the majority of consult today discussing stress management and suggesting to her that she take measures to have her son taken out of her home in order to focus on her own health. Pt states her son has been in and out of assisted. Discussed option of chaplaincy 1:1 consult through [place] and she will thinka bout doing this. Also discussed with Pt weight loss principles and suggested doing a 3 day food log and 1:1 dietary referral for weight management. Pt also wants to think about this before taking action. Losing weight and managing stress is important to her, but she is still in contemplation. Pt does feel she is gaining benefit from attending [place]. Pt to continue [place] for safe progression of exercise, support, and education regarding management of lung disease. 2/14 Pt has been plateauing her in progression for the last 3 weeks iN [place], however, today she exceeded expectations and easily completed her workout. She feels mroe upbeat and has more energy. She is starting to become more organized at home which will help her manage her stress and also help with end of life planning. She discussed this today. Challenged Pt to start exercising now at home and discussed her options for continuing an exercise program upon discharge from [place]. She is contemplating this and was introduced to the option of the W[place] program today. Pt to continue [place] for education on stress/anxiety management, progression of exercise and support. 3/13/17 Pt is discharging today with all of her surveys improving and her 6 min walk distance up 27meters. She has not started a home exercise program but claims that tomorrow she will be joining the senior center who has a nustep and TM available.

## 2017-12-26 NOTE — MR AVS SNAPSHOT
"              After Visit Summary   2017    Pat Haley    MRN: [id number]           Patient Information     Date Of Birth          1945        Visit Information        Provider Department      2017 2:15 PM Eder Cage MD [facility]        Today's Diagnoses     Bradycardia    -  1       Follow-ups after your visit        Who to contact     If you have questions or need follow up information about today's clinic visit or your schedule please contact [facility] directly at [phone].  Normal or non-critical lab and imaging results will be communicated to you by IPICOhart, letter or phone within 4 business days after the clinic has received the results. If you do not hear from us within 7 days, please contact the clinic through BeanStockdt or phone. If you have a critical or abnormal lab result, we will notify you by phone as soon as possible.  Submit refill requests through [employer] or call your pharmacy and they will forward the refill request to us. Please allow 3 business days for your refill to be completed.          Additional Information About Your Visit        MyChart Information     [employer] lets you send messages to your doctor, view your test results, renew your prescriptions, schedule appointments and more. To sign up, go to [email or web address] . Click on \"Log in\" on the left side of the screen, which will take you to the Welcome page. Then click on \"Sign up Now\" on the right side of the page.     You will be asked to enter the access code listed below, as well as some personal information. Please follow the directions to create your username and password.     Your access code is: [email or web address]  Expires: 3/26/2018  2:55 PM     Your access code will  in 90 days. If you need help or a new code, please call your [place] clinic or [phone].        Care EveryWhere ID     This is your " "Care EveryWhere ID. This could be used by other organizations to access your [facility] medical records  [id number]        Your Vitals Were     Pulse Height BMI (Body Mass Index)             54 1.651 m (5' 5\") 46.36 kg/m2          Blood Pressure from Last 3 Encounters:   12/26/17 128/80   11/16/16 128/63   11/09/16 100/49    Weight from Last 3 Encounters:   12/26/17 126.4 kg (278 lb 9.6 oz)   03/13/17 126.1 kg (278 lb)   03/02/17 125.2 kg (276 lb)              Today, you had the following     No orders found for display         Today's Medication Changes          These changes are accurate as of: 12/26/17  2:55 PM.  If you have any questions, ask your nurse or doctor.               Stop taking these medicines if you haven't already. Please contact your care team if you have questions.     metoprolol 50 MG 24 hr tablet   Commonly known as:  TOPROL-XL   Stopped by:  Eder Cage MD           VESICARE PO   Stopped by:  Eder Cgae MD                    Primary Care Provider Office Phone # Fax #    April Brockzlarpita [phone]       [address]   [facility] [address]        Equal Access to Services     BRICE ROBLES AH: Hadleyda perezo Sooliali, waaxda luqadaha, qaybta kaalmada adeegyada, greta quiñones. So [facility] [phone].    ATENCIÓN: Si habla español, tiene a anderson disposición servicios gratuitos de asistencia lingüística. Llame al [phone].    We comply with applicable federal civil rights laws and [place] laws. We do not discriminate on the basis of race, color, national origin, age, disability, sex, sexual orientation, or gender identity.            Thank you!     Thank you for choosing [facility]  for your care. Our goal is always to provide you with excellent care. Hearing back from our patients is one way we can continue to improve our services. Please take a few minutes to " complete the written survey that you may receive in the mail after your visit with us. Thank you!             Your Updated Medication List - Protect others around you: Learn how to safely use, store and throw away your medicines at www.disposemymeds.org.          This list is accurate as of: 12/26/17  2:55 PM.  Always use your most recent med list.                   Brand Name Dispense Instructions for use Diagnosis    albuterol 108 (90 BASE) MCG/ACT Inhaler    PROAIR HFA/PROVENTIL HFA/VENTOLIN HFA    1 Inhaler    Inhale 2 puffs into the lungs every 6 hours as needed for shortness of breath / dyspnea or wheezing    COPD (chronic obstructive pulmonary disease) (H)       aspirin 325 MG EC tablet      Take 1 tablet (325 mg) by mouth daily    Benign essential hypertension       atorvastatin 20 MG tablet    LIPITOR    30 tablet    Take 1 tablet (20 mg) by mouth daily    Abnormal cardiovascular stress test       buPROPion 300 MG 24 hr tablet    WELLBUTRIN XL     Take 300 mg by mouth every morning        DIGESTIVE HEALTH PROBIOTIC Caps      Take 1 tablet by mouth daily        FLUOXETINE HCL PO      Take 40 mg by mouth daily        losartan-hydrochlorothiazide 100-25 MG per tablet    HYZAAR     Take 1 tablet by mouth daily        nitroGLYcerin 0.3 MG sublingual tablet    NITROSTAT    60 tablet    Place 1 tablet (0.3 mg) under the tongue every 5 minutes as needed for chest pain if you still have symptoms after 3 doses (15 min) call 911    Chest tightness or pressure, CARDIOVASCULAR SCREENING; LDL GOAL LESS THAN 130, Morbid obesity due to excess calories (H)       omeprazole 20 MG CR capsule    priLOSEC     Take 20 mg by mouth every morning (before breakfast)        oxybutynin 10 MG 24 hr tablet    DITROPAN-XL     Take 10 mg by mouth daily        rOPINIRole 1 MG tablet    REQUIP     Take 1 mg by mouth At Bedtime Take two at bedtime and one in the morning as needed        STOOL SOFTENER PO      1-2 tablets daily         traZODone 150 MG tablet    DESYREL     Take 150 mg by mouth nightly as needed for sleep 1/2 pill as needed        TYLENOL PO      Take 500 mg by mouth as needed for mild pain or fever        Vitamin D (Cholecalciferol) 1000 UNITS Tabs      Take 2,000 Units by mouth daily

## 2017-12-26 NOTE — PROGRESS NOTES
HISTORY OF PRESENT ILLNESS:  Pat Haley, a 72-year-old woman with recurrent lung cancer (awaiting right lung surgery), hypertension, bradycardia, obesity, dyslipidemia, depression and obstructive sleep apnea was evaluated prior to lung surgery at the request of   for bradycardia.      I saw Ms. Haley initially in 10/2016 for evaluation of episodic atypical chest discomfort and an abnormal stress test.  Diagnostic coronary angiography showed angiographically normal coronaries with a right dominant system.      The patient has a history of left lung squamous cell carcinoma and previously underwent resection.  Recent followup testing has demonstrated right lung carcinoma for which the patient is scheduled to undergo right lung surgery with Dr. Villavicencio.      During the patient's recent visit with Dr. Torrez, she noted the patient's heart rate was slow.  Review of my records indicates the patient's heart rates have run between 54 and 62 during all previous visits.  She remains on metoprolol XL at 25 mg daily.  The patient denies syncope.      PAST MEDICAL HISTORY:   1.  Depression.   2.  Hypertension.   3.  Obesity.   4.  Squamous cell carcinoma of the lung.   a.  History of left lower lung resection.   b.  Recurrent tumor on recent scanning.  Awaiting right lung surgery.   5.  Dyslipidemia, on atorvastatin.   6.  Depression.   7.  Hypertension.      PHYSICAL EXAMINATION:   GENERAL:  Exam today demonstrates a very pleasant, cooperative 72-year-old woman who is overweight.   VITAL SIGNS:  Her blood pressure is 120/80, heart rate 54 and regular.  Her height is 1.6 meters.  Her weight is 126.4 kg.  Her BMI is 46.5.   LUNGS:  Clear to percussion and auscultation.   CARDIOVASCULAR:  Shows a normal S1 with a normal S2, no S3.  There is no murmur, rub or click.   EXTREMITIES:  Her pulses are full and symmetrical.      LABORATORY STUDIES:  Her last ECG in 11/2016 showed a sinus bradycardia.      ASSESSMENT:   Ms. Bryan has longstanding sinus bradycardia, potentially worsened by beta blocker therapy.  At present, there is no indication for pacemaker implantation, but I do not see a clear indication for metoprolol.  I would simply stop this medication, which can exacerbate underlying bradycardia. I have explained to the patient and her family that it is possible at some time in the future that she may develop bradycardia that becomes significant enough to warrant pacemaker implantation, but that there is currently no indication for PPM.     In view of her normal coronary angiogram, no further cardiac testing is indicated prior to upcoming lung surgery.        RECOMMENDATIONS:   1.  Discontinue Toprol-XL.   2.  No further cardiac testing needed at this time prior to upcoming lung surgery.   3.  Can follow her rhythm with primary care physician.  We can reassess if she develops syncope or symptomatic bradycardia.      We have appreciated the opportunity to care for your patient, Yamila Bryan.      cc:      Thiago Villavicencio MD    [facility]   [address] [place] [address], [address]   [place] [address]       Nita Torrez MD    [facility] [address]   [place] [address]       Marshal Mandujano MD   [facility]         ADALID ISRAEL MD             D: 2017 15:04   T: 2017 15:33   MT: TARA      Name:     YAMILA BRYAN   MRN:      [id number]        Account:      [id number]   :      1945           Service Date: 2017      Document: [id number]

## 2017-12-26 NOTE — LETTER
12/26/2017 April [facility] [address]   [facility] [address]      RE: Pat Burchgayathri       Dear Colleague,    I had the pleasure of seeing Pat Haley in the [facility].    HISTORY OF PRESENT ILLNESS:  Pat Haley, a 72-year-old woman with recurrent lung cancer (awaiting right lung surgery), hypertension, bradycardia, obesity, dyslipidemia, depression and obstructive sleep apnea was evaluated prior to lung surgery at the request of   for bradycardia.      I saw Ms. Haley initially in 10/2016 for evaluation of episodic atypical chest discomfort and an abnormal stress test.  Diagnostic coronary angiography showed angiographically normal coronaries with a right dominant system.      The patient has a history of left lung squamous cell carcinoma and previously underwent resection.  Recent followup testing has demonstrated right lung carcinoma for which the patient is scheduled to undergo right lung surgery with Dr. Villavicencio.      During the patient's recent visit with Dr. Torrez, she noted the patient's heart rate was slow.  Review of my records indicates the patient's heart rates have run between 54 and 62 during all previous visits.  She remains on metoprolol XL at 25 mg daily.  The patient denies syncope.      PAST MEDICAL HISTORY:   1.  Depression.   2.  Hypertension.   3.  Obesity.   4.  Squamous cell carcinoma of the lung.   a.  History of left lower lung resection.   b.  Recurrent tumor on recent scanning.  Awaiting right lung surgery.   5.  Dyslipidemia, on atorvastatin.   6.  Depression.   7.  Hypertension.      PHYSICAL EXAMINATION:   GENERAL:  Exam today demonstrates a very pleasant, cooperative 72-year-old woman who is overweight.   VITAL SIGNS:  Her blood pressure is 120/80, heart rate 54 and regular.  Her height is 1.6 meters.  Her weight is 126.4 kg.  Her BMI is 46.5.   LUNGS:  Clear to percussion and auscultation.    CARDIOVASCULAR:  Shows a normal S1 with a normal S2, no S3.  There is no murmur, rub or click.   EXTREMITIES:  Her pulses are full and symmetrical.      LABORATORY STUDIES:  Her last ECG in 11/2016 showed a sinus bradycardia.      ASSESSMENT:  Ms. Haley has longstanding sinus bradycardia, potentially worsened by beta blocker therapy.  At present, there is no indication for pacemaker implantation, but I do not see a clear indication for metoprolol.  I would simply stop this medication, which can exacerbate underlying bradycardia. I have explained to the patient and her family that it is possible at some time in the future that she may develop bradycardia that becomes significant enough to warrant pacemaker implantation, but that there is currently no indication for PPM.     In view of her normal coronary angiogram, no further cardiac testing is indicated prior to upcoming lung surgery.        RECOMMENDATIONS:   1.  Discontinue Toprol-XL.   2.  No further cardiac testing needed at this time prior to upcoming lung surgery.   3.  Can follow her rhythm with primary care physician.  We can reassess if she develops syncope or symptomatic bradycardia.      We have appreciated the opportunity to care for your patient, Pat Haley.      Outpatient Encounter Prescriptions as of 12/26/2017   Medication Sig Dispense Refill     buPROPion (WELLBUTRIN XL) 300 MG 24 hr tablet Take 300 mg by mouth every morning       oxybutynin (DITROPAN-XL) 10 MG 24 hr tablet Take 10 mg by mouth daily       atorvastatin (LIPITOR) 20 MG tablet Take 1 tablet (20 mg) by mouth daily 30 tablet 11     aspirin  MG EC tablet Take 1 tablet (325 mg) by mouth daily       nitroglycerin (NITROSTAT) 0.3 MG SL tablet Place 1 tablet (0.3 mg) under the tongue every 5 minutes as needed for chest pain if you still have symptoms after 3 doses (15 min) call [phone] 60 tablet 3     Acetaminophen (TYLENOL PO) Take 500 mg by mouth as needed for mild pain or  fever       FLUOXETINE HCL PO Take 40 mg by mouth daily        Lactobacillus (DIGESTIVE HEALTH PROBIOTIC) CAPS Take 1 tablet by mouth daily       Docusate Calcium (STOOL SOFTENER PO) 1-2 tablets daily       albuterol (PROAIR HFA, PROVENTIL HFA, VENTOLIN HFA) 108 (90 BASE) MCG/ACT inhaler Inhale 2 puffs into the lungs every 6 hours as needed for shortness of breath / dyspnea or wheezing 1 Inhaler 1     omeprazole (PRILOSEC) 20 MG capsule Take 20 mg by mouth every morning (before breakfast)       traZODone (DESYREL) 150 MG tablet Take 150 mg by mouth nightly as needed for sleep 1/2 pill as needed       losartan-hydrochlorothiazide (HYZAAR) 100-25 MG per tablet Take 1 tablet by mouth daily       rOPINIRole (REQUIP) 1 MG tablet Take 1 mg by mouth At Bedtime Take two at bedtime and one in the morning as needed       Vitamin D, Cholecalciferol, 1000 UNITS TABS Take 2,000 Units by mouth daily        [DISCONTINUED] metoprolol (TOPROL-XL) 50 MG 24 hr tablet Take 0.5 tablets (25 mg) by mouth daily 30 tablet      [DISCONTINUED] Solifenacin Succinate (VESICARE PO) Take 10 mg by mouth daily       No facility-administered encounter medications on file as of 12/26/2017.      Again, thank you for allowing me to participate in the care of your patient.      Sincerely,    Eder Cage MD     [facility]    cc:      Thiago Villavicencio MD    [facility]   [address]   [place] [address]       Nita Torrez MD    [facility]   [address] , [address]   [place] [address]       Marshal Mandujano MD   [facility]

## 2017-12-26 NOTE — PROGRESS NOTES
HISTORY OF PRESENT ILLNESS:  Asymptomatic bradycardia.    Orders this Visit:  No orders of the defined types were placed in this encounter.    Orders Placed This Encounter   Medications     buPROPion (WELLBUTRIN XL) 300 MG 24 hr tablet     Sig: Take 300 mg by mouth every morning     oxybutynin (DITROPAN-XL) 10 MG 24 hr tablet     Sig: Take 10 mg by mouth daily     Medications Discontinued During This Encounter   Medication Reason     Solifenacin Succinate (VESICARE PO) Alternate therapy     metoprolol (TOPROL-XL) 50 MG 24 hr tablet        No diagnosis found.    CURRENT MEDICATIONS:  Current Outpatient Prescriptions   Medication Sig Dispense Refill     buPROPion (WELLBUTRIN XL) 300 MG 24 hr tablet Take 300 mg by mouth every morning       oxybutynin (DITROPAN-XL) 10 MG 24 hr tablet Take 10 mg by mouth daily       atorvastatin (LIPITOR) 20 MG tablet Take 1 tablet (20 mg) by mouth daily 30 tablet 11     aspirin  MG EC tablet Take 1 tablet (325 mg) by mouth daily       nitroglycerin (NITROSTAT) 0.3 MG SL tablet Place 1 tablet (0.3 mg) under the tongue every 5 minutes as needed for chest pain if you still have symptoms after 3 doses (15 min) call [phone] 60 tablet 3     Acetaminophen (TYLENOL PO) Take 500 mg by mouth as needed for mild pain or fever       FLUOXETINE HCL PO Take 40 mg by mouth daily        Lactobacillus (DIGESTIVE HEALTH PROBIOTIC) CAPS Take 1 tablet by mouth daily       Docusate Calcium (STOOL SOFTENER PO) 1-2 tablets daily       albuterol (PROAIR HFA, PROVENTIL HFA, VENTOLIN HFA) 108 (90 BASE) MCG/ACT inhaler Inhale 2 puffs into the lungs every 6 hours as needed for shortness of breath / dyspnea or wheezing 1 Inhaler 1     omeprazole (PRILOSEC) 20 MG capsule Take 20 mg by mouth every morning (before breakfast)       traZODone (DESYREL) 150 MG tablet Take 150 mg by mouth nightly as needed for sleep 1/2 pill as needed       losartan-hydrochlorothiazide (HYZAAR) 100-25 MG per tablet Take 1 tablet by mouth  "daily       rOPINIRole (REQUIP) 1 MG tablet Take 1 mg by mouth At Bedtime Take two at bedtime and one in the morning as needed       Vitamin D, Cholecalciferol, 1000 UNITS TABS Take 2,000 Units by mouth daily        [DISCONTINUED] FLUoxetine HCl (PROZAC PO) Take 20 mg by mouth daily         ALLERGIES     Allergies   Allergen Reactions     Tetanus Immune Globulin      \" almost killed me\"  High fever and in bed for 2 weeks -- told to never get another one       PAST MEDICAL, SURGICAL, FAMILY, SOCIAL HISTORY:  History was reviewed and updated as needed, see medical record.    Review of Systems:  A 12-point review of systems was completed, see medical record for detailed review of systems information.    Physical Exam:  Vitals: /80 (BP Location: Right arm, Patient Position: Chair, Cuff Size: Adult Large)  Pulse 54  Ht 1.651 m (5' 5\")  Wt 126.4 kg (278 lb 9.6 oz)  BMI 46.36 kg/m2    Constitutional:  cooperative, alert and oriented, well developed, well nourished, in no acute distress        Skin:  warm and dry to the touch, no apparent skin lesions or masses noted        Head:  normocephalic, no masses or lesions        Eyes:  pupils equal and round, conjunctivae and lids unremarkable, sclera white, no xanthalasma, EOMS intact, no nystagmus        ENT:  no pallor or cyanosis, dentition good        Neck:  carotid pulses are full and equal bilaterally, JVP normal, no carotid bruit        Chest:  normal breath sounds, clear to auscultation, normal A-P diameter, normal symmetry, normal respiratory excursion, no use of accessory muscles        Cardiac: regular rhythm, normal S1/S2, no S3 or S4, apical impulse not displaced, no murmurs, gallops or rubs                  Abdomen:  abdomen soft, non-tender, BS normoactive, no mass, no HSM, no bruits        Vascular: pulses full and equal, no bruits auscultated                                      Extremities and Back:  no deformities, clubbing, cyanosis, erythema " observed        Neurological:  no gross motor deficits        ASSESSMENT:  Stop beta blocker       RECOMMENDATIONS: NO further cardiac testing needed.       Recent Lab Results:  LIPID RESULTS:  Lab Results   Component Value Date    CHOL 187 11/24/2015    HDL 57 11/24/2015    LDL 99 11/24/2015    TRIG 157 (A) 11/24/2015       LIVER ENZYME RESULTS:  Lab Results   Component Value Date    AST 23 11/24/2015    ALT 27 11/24/2015       CBC RESULTS:  Lab Results   Component Value Date    WBC 7.1 02/10/2015    RBC 4.35 02/10/2015    HGB 13.2 11/07/2016    HCT 38.1 02/10/2015    MCV 88 02/10/2015    MCH 28.5 02/10/2015    MCHC 32.5 02/10/2015    RDW 14.0 02/10/2015     11/07/2016       BMP RESULTS:  Lab Results   Component Value Date     11/07/2016    POTASSIUM 3.9 11/07/2016    CHLORIDE 105 11/07/2016    CO2 31 11/07/2016    ANIONGAP 7 11/07/2016    GLC 97 11/07/2016    BUN 23 11/07/2016    CR 0.81 11/07/2016    GFRESTIMATED 69 11/07/2016    GFRESTBLACK 84 11/07/2016    EVIE 8.8 11/07/2016        A1C RESULTS:  No results found for: A1C    INR RESULTS:  Lab Results   Component Value Date    INR 1.07 11/07/2016    INR 0.94 02/10/2015       We greatly appreciate the opportunity to be involved in the care of your patient, Pat Haley.    Sincerely,  Eder Cage MD      [address] [place] [address]

## 2018-01-01 ENCOUNTER — SURGERY (OUTPATIENT)
Age: 73
End: 2018-01-01

## 2018-01-01 ENCOUNTER — APPOINTMENT (OUTPATIENT)
Dept: GENERAL RADIOLOGY | Facility: CLINIC | Age: 73
DRG: 163 | End: 2018-01-01
Attending: PHYSICIAN ASSISTANT
Payer: MEDICARE

## 2018-01-01 ENCOUNTER — HOSPITAL ENCOUNTER (INPATIENT)
Facility: CLINIC | Age: 73
LOS: 3 days | DRG: 163 | End: 2018-01-08
Attending: THORACIC SURGERY (CARDIOTHORACIC VASCULAR SURGERY) | Admitting: THORACIC SURGERY (CARDIOTHORACIC VASCULAR SURGERY)
Payer: MEDICARE

## 2018-01-01 ENCOUNTER — ANESTHESIA (OUTPATIENT)
Dept: SURGERY | Facility: CLINIC | Age: 73
DRG: 163 | End: 2018-01-01
Payer: MEDICARE

## 2018-01-01 ENCOUNTER — ANESTHESIA (OUTPATIENT)
Dept: MEDSURG UNIT | Facility: CLINIC | Age: 73
DRG: 163 | End: 2018-01-01
Payer: MEDICARE

## 2018-01-01 ENCOUNTER — ANESTHESIA EVENT (OUTPATIENT)
Dept: SURGERY | Facility: CLINIC | Age: 73
DRG: 163 | End: 2018-01-01
Payer: MEDICARE

## 2018-01-01 ENCOUNTER — ANESTHESIA EVENT (OUTPATIENT)
Dept: MEDSURG UNIT | Facility: CLINIC | Age: 73
DRG: 163 | End: 2018-01-01
Payer: MEDICARE

## 2018-01-01 ENCOUNTER — APPOINTMENT (OUTPATIENT)
Dept: PHYSICAL THERAPY | Facility: CLINIC | Age: 73
DRG: 163 | End: 2018-01-01
Attending: THORACIC SURGERY (CARDIOTHORACIC VASCULAR SURGERY)
Payer: MEDICARE

## 2018-01-01 ENCOUNTER — APPOINTMENT (OUTPATIENT)
Dept: GENERAL RADIOLOGY | Facility: CLINIC | Age: 73
DRG: 163 | End: 2018-01-01
Attending: THORACIC SURGERY (CARDIOTHORACIC VASCULAR SURGERY)
Payer: MEDICARE

## 2018-01-01 VITALS
DIASTOLIC BLOOD PRESSURE: 70 MMHG | BODY MASS INDEX: 50.61 KG/M2 | OXYGEN SATURATION: 91 % | SYSTOLIC BLOOD PRESSURE: 113 MMHG | TEMPERATURE: 97.8 F | WEIGHT: 275 LBS | HEART RATE: 85 BPM | RESPIRATION RATE: 18 BRPM | HEIGHT: 62 IN

## 2018-01-01 LAB
ABO + RH BLD: NORMAL
ALBUMIN SERPL-MCNC: 1.9 G/DL (ref 3.4–5)
ALP SERPL-CCNC: 80 U/L (ref 40–150)
ALT SERPL W P-5'-P-CCNC: 41 U/L (ref 0–50)
ANION GAP SERPL CALCULATED.3IONS-SCNC: 10 MMOL/L (ref 3–14)
ANION GAP SERPL CALCULATED.3IONS-SCNC: 14 MMOL/L (ref 3–14)
ANION GAP SERPL CALCULATED.3IONS-SCNC: 8 MMOL/L (ref 3–14)
APTT PPP: 29 SEC (ref 22–37)
AST SERPL W P-5'-P-CCNC: 103 U/L (ref 0–45)
BILIRUB SERPL-MCNC: 0.2 MG/DL (ref 0.2–1.3)
BLD GP AB SCN SERPL QL: NORMAL
BLD GP AB SCN SERPL QL: NORMAL
BLD PROD TYP BPU: NORMAL
BLD UNIT ID BPU: 0
BLOOD BANK CMNT PATIENT-IMP: NORMAL
BLOOD BANK CMNT PATIENT-IMP: NORMAL
BLOOD PRODUCT CODE: NORMAL
BPU ID: NORMAL
BUN SERPL-MCNC: 23 MG/DL (ref 7–30)
BUN SERPL-MCNC: 28 MG/DL (ref 7–30)
BUN SERPL-MCNC: 32 MG/DL (ref 7–30)
BUN SERPL-MCNC: 33 MG/DL (ref 7–30)
BUN SERPL-MCNC: 34 MG/DL (ref 7–30)
CALCIUM SERPL-MCNC: 7.3 MG/DL (ref 8.5–10.1)
CALCIUM SERPL-MCNC: 7.6 MG/DL (ref 8.5–10.1)
CALCIUM SERPL-MCNC: 7.9 MG/DL (ref 8.5–10.1)
CALCIUM SERPL-MCNC: 9.4 MG/DL (ref 8.5–10.1)
CALCIUM SERPL-MCNC: 9.7 MG/DL (ref 8.5–10.1)
CHLORIDE SERPL-SCNC: 101 MMOL/L (ref 94–109)
CHLORIDE SERPL-SCNC: 105 MMOL/L (ref 94–109)
CHLORIDE SERPL-SCNC: 105 MMOL/L (ref 94–109)
CHLORIDE SERPL-SCNC: 106 MMOL/L (ref 94–109)
CHLORIDE SERPL-SCNC: 107 MMOL/L (ref 94–109)
CO2 SERPL-SCNC: 23 MMOL/L (ref 20–32)
CO2 SERPL-SCNC: 23 MMOL/L (ref 20–32)
CO2 SERPL-SCNC: 24 MMOL/L (ref 20–32)
CO2 SERPL-SCNC: 25 MMOL/L (ref 20–32)
CO2 SERPL-SCNC: 26 MMOL/L (ref 20–32)
COPATH REPORT: NORMAL
CREAT SERPL-MCNC: 0.78 MG/DL (ref 0.52–1.04)
CREAT SERPL-MCNC: 0.81 MG/DL (ref 0.52–1.04)
CREAT SERPL-MCNC: 1.22 MG/DL (ref 0.52–1.04)
CREAT SERPL-MCNC: 1.29 MG/DL (ref 0.52–1.04)
CREAT SERPL-MCNC: 1.57 MG/DL (ref 0.52–1.04)
CREAT SERPL-MCNC: 1.63 MG/DL (ref 0.52–1.04)
ERYTHROCYTE [DISTWIDTH] IN BLOOD BY AUTOMATED COUNT: 13.8 % (ref 10–15)
ERYTHROCYTE [DISTWIDTH] IN BLOOD BY AUTOMATED COUNT: 13.9 % (ref 10–15)
ERYTHROCYTE [DISTWIDTH] IN BLOOD BY AUTOMATED COUNT: 13.9 % (ref 10–15)
ERYTHROCYTE [DISTWIDTH] IN BLOOD BY AUTOMATED COUNT: 14.4 % (ref 10–15)
ERYTHROCYTE [DISTWIDTH] IN BLOOD BY AUTOMATED COUNT: 14.5 % (ref 10–15)
ERYTHROCYTE [DISTWIDTH] IN BLOOD BY AUTOMATED COUNT: 14.7 % (ref 10–15)
FIBRINOGEN PPP-MCNC: 268 MG/DL (ref 200–420)
GFR SERPL CREATININE-BSD FRML MDRD: 31 ML/MIN/1.7M2
GFR SERPL CREATININE-BSD FRML MDRD: 32 ML/MIN/1.7M2
GFR SERPL CREATININE-BSD FRML MDRD: 40 ML/MIN/1.7M2
GFR SERPL CREATININE-BSD FRML MDRD: 43 ML/MIN/1.7M2
GFR SERPL CREATININE-BSD FRML MDRD: 69 ML/MIN/1.7M2
GFR SERPL CREATININE-BSD FRML MDRD: 72 ML/MIN/1.7M2
GLUCOSE BLDC GLUCOMTR-MCNC: 140 MG/DL (ref 70–99)
GLUCOSE SERPL-MCNC: 111 MG/DL (ref 70–99)
GLUCOSE SERPL-MCNC: 116 MG/DL (ref 70–99)
GLUCOSE SERPL-MCNC: 117 MG/DL (ref 70–99)
GLUCOSE SERPL-MCNC: 161 MG/DL (ref 70–99)
GLUCOSE SERPL-MCNC: 266 MG/DL (ref 70–99)
HCT VFR BLD AUTO: 20.3 % (ref 35–47)
HCT VFR BLD AUTO: 23.7 % (ref 35–47)
HCT VFR BLD AUTO: 24 % (ref 35–47)
HCT VFR BLD AUTO: 28 % (ref 35–47)
HCT VFR BLD AUTO: 33 % (ref 35–47)
HCT VFR BLD AUTO: 39.9 % (ref 35–47)
HGB BLD-MCNC: 11.6 G/DL (ref 11.7–15.7)
HGB BLD-MCNC: 13.4 G/DL (ref 11.7–15.7)
HGB BLD-MCNC: 6.5 G/DL (ref 11.7–15.7)
HGB BLD-MCNC: 7.5 G/DL (ref 11.7–15.7)
HGB BLD-MCNC: 7.6 G/DL (ref 11.7–15.7)
HGB BLD-MCNC: 9.2 G/DL (ref 11.7–15.7)
HGB BLD-MCNC: 9.3 G/DL (ref 11.7–15.7)
INR PPP: 0.92 (ref 0.86–1.14)
INR PPP: 1.11 (ref 0.86–1.14)
LACTATE BLD-SCNC: 2.4 MMOL/L (ref 0.7–2)
MAGNESIUM SERPL-MCNC: 2.4 MG/DL (ref 1.6–2.3)
MCH RBC QN AUTO: 30.6 PG (ref 26.5–33)
MCH RBC QN AUTO: 30.6 PG (ref 26.5–33)
MCH RBC QN AUTO: 31.6 PG (ref 26.5–33)
MCH RBC QN AUTO: 31.6 PG (ref 26.5–33)
MCH RBC QN AUTO: 31.7 PG (ref 26.5–33)
MCH RBC QN AUTO: 32.1 PG (ref 26.5–33)
MCHC RBC AUTO-ENTMCNC: 31.6 G/DL (ref 31.5–36.5)
MCHC RBC AUTO-ENTMCNC: 31.7 G/DL (ref 31.5–36.5)
MCHC RBC AUTO-ENTMCNC: 32 G/DL (ref 31.5–36.5)
MCHC RBC AUTO-ENTMCNC: 32.9 G/DL (ref 31.5–36.5)
MCHC RBC AUTO-ENTMCNC: 33.6 G/DL (ref 31.5–36.5)
MCHC RBC AUTO-ENTMCNC: 35.2 G/DL (ref 31.5–36.5)
MCV RBC AUTO: 90 FL (ref 78–100)
MCV RBC AUTO: 96 FL (ref 78–100)
MCV RBC AUTO: 97 FL (ref 78–100)
MCV RBC AUTO: 99 FL (ref 78–100)
NUM BPU REQUESTED: 0
NUM BPU REQUESTED: 4
PLATELET # BLD AUTO: 145 10E9/L (ref 150–450)
PLATELET # BLD AUTO: 158 10E9/L (ref 150–450)
PLATELET # BLD AUTO: 178 10E9/L (ref 150–450)
PLATELET # BLD AUTO: 179 10E9/L (ref 150–450)
PLATELET # BLD AUTO: 189 10E9/L (ref 150–450)
PLATELET # BLD AUTO: 195 10E9/L (ref 150–450)
POTASSIUM SERPL-SCNC: 3.6 MMOL/L (ref 3.4–5.3)
POTASSIUM SERPL-SCNC: 3.8 MMOL/L (ref 3.4–5.3)
POTASSIUM SERPL-SCNC: 3.9 MMOL/L (ref 3.4–5.3)
POTASSIUM SERPL-SCNC: 4 MMOL/L (ref 3.4–5.3)
POTASSIUM SERPL-SCNC: 4.5 MMOL/L (ref 3.4–5.3)
PROT SERPL-MCNC: 4.7 G/DL (ref 6.8–8.8)
RBC # BLD AUTO: 2.06 10E12/L (ref 3.8–5.2)
RBC # BLD AUTO: 2.45 10E12/L (ref 3.8–5.2)
RBC # BLD AUTO: 2.48 10E12/L (ref 3.8–5.2)
RBC # BLD AUTO: 2.9 10E12/L (ref 3.8–5.2)
RBC # BLD AUTO: 3.67 10E12/L (ref 3.8–5.2)
RBC # BLD AUTO: 4.17 10E12/L (ref 3.8–5.2)
SEE SCANNED REPORT: NORMAL
SODIUM SERPL-SCNC: 137 MMOL/L (ref 133–144)
SODIUM SERPL-SCNC: 138 MMOL/L (ref 133–144)
SODIUM SERPL-SCNC: 139 MMOL/L (ref 133–144)
SODIUM SERPL-SCNC: 139 MMOL/L (ref 133–144)
SODIUM SERPL-SCNC: 140 MMOL/L (ref 133–144)
SPECIMEN EXP DATE BLD: NORMAL
SPECIMEN EXP DATE BLD: NORMAL
TRANSFUSION STATUS PATIENT QL: NORMAL
TROPONIN I SERPL-MCNC: 0.78 UG/L (ref 0–0.04)
WBC # BLD AUTO: 13.4 10E9/L (ref 4–11)
WBC # BLD AUTO: 14.6 10E9/L (ref 4–11)
WBC # BLD AUTO: 16.5 10E9/L (ref 4–11)
WBC # BLD AUTO: 9.3 10E9/L (ref 4–11)
WBC # BLD AUTO: 9.4 10E9/L (ref 4–11)
WBC # BLD AUTO: 9.7 10E9/L (ref 4–11)

## 2018-01-01 PROCEDURE — 82565 ASSAY OF CREATININE: CPT | Performed by: ANESTHESIOLOGY

## 2018-01-01 PROCEDURE — 85049 AUTOMATED PLATELET COUNT: CPT | Performed by: INTERNAL MEDICINE

## 2018-01-01 PROCEDURE — 12000007 ZZH R&B INTERMEDIATE

## 2018-01-01 PROCEDURE — 40000671 ZZH STATISTIC ANESTHESIA CASE

## 2018-01-01 PROCEDURE — 25000128 H RX IP 250 OP 636: Performed by: INTERNAL MEDICINE

## 2018-01-01 PROCEDURE — 88341 IMHCHEM/IMCYTCHM EA ADD ANTB: CPT | Mod: 26,76 | Performed by: THORACIC SURGERY (CARDIOTHORACIC VASCULAR SURGERY)

## 2018-01-01 PROCEDURE — 85027 COMPLETE CBC AUTOMATED: CPT | Performed by: ANESTHESIOLOGY

## 2018-01-01 PROCEDURE — 25000125 ZZHC RX 250: Performed by: ANESTHESIOLOGY

## 2018-01-01 PROCEDURE — 25000128 H RX IP 250 OP 636: Performed by: THORACIC SURGERY (CARDIOTHORACIC VASCULAR SURGERY)

## 2018-01-01 PROCEDURE — 40000275 ZZH STATISTIC RCP TIME EA 10 MIN

## 2018-01-01 PROCEDURE — 25000128 H RX IP 250 OP 636: Performed by: PHYSICIAN ASSISTANT

## 2018-01-01 PROCEDURE — 03LY0CZ OCCLUSION OF UPPER ARTERY WITH EXTRALUMINAL DEVICE, OPEN APPROACH: ICD-10-PCS | Performed by: THORACIC SURGERY (CARDIOTHORACIC VASCULAR SURGERY)

## 2018-01-01 PROCEDURE — 27210339 ZZH CIRCUIT HUMIDITY W/CPAP BIP

## 2018-01-01 PROCEDURE — 36415 COLL VENOUS BLD VENIPUNCTURE: CPT | Performed by: THORACIC SURGERY (CARDIOTHORACIC VASCULAR SURGERY)

## 2018-01-01 PROCEDURE — P9041 ALBUMIN (HUMAN),5%, 50ML: HCPCS | Performed by: THORACIC SURGERY (CARDIOTHORACIC VASCULAR SURGERY)

## 2018-01-01 PROCEDURE — 25000125 ZZHC RX 250: Performed by: PHYSICIAN ASSISTANT

## 2018-01-01 PROCEDURE — A9270 NON-COVERED ITEM OR SERVICE: HCPCS | Mod: GY | Performed by: PHYSICIAN ASSISTANT

## 2018-01-01 PROCEDURE — 25000132 ZZH RX MED GY IP 250 OP 250 PS 637: Mod: GY | Performed by: THORACIC SURGERY (CARDIOTHORACIC VASCULAR SURGERY)

## 2018-01-01 PROCEDURE — 25000125 ZZHC RX 250: Performed by: THORACIC SURGERY (CARDIOTHORACIC VASCULAR SURGERY)

## 2018-01-01 PROCEDURE — P9041 ALBUMIN (HUMAN),5%, 50ML: HCPCS | Performed by: ANESTHESIOLOGY

## 2018-01-01 PROCEDURE — 99233 SBSQ HOSP IP/OBS HIGH 50: CPT | Performed by: INTERNAL MEDICINE

## 2018-01-01 PROCEDURE — 99232 SBSQ HOSP IP/OBS MODERATE 35: CPT | Performed by: INTERNAL MEDICINE

## 2018-01-01 PROCEDURE — 86900 BLOOD TYPING SEROLOGIC ABO: CPT | Performed by: THORACIC SURGERY (CARDIOTHORACIC VASCULAR SURGERY)

## 2018-01-01 PROCEDURE — 80048 BASIC METABOLIC PNL TOTAL CA: CPT | Performed by: THORACIC SURGERY (CARDIOTHORACIC VASCULAR SURGERY)

## 2018-01-01 PROCEDURE — 88309 TISSUE EXAM BY PATHOLOGIST: CPT | Mod: 26 | Performed by: THORACIC SURGERY (CARDIOTHORACIC VASCULAR SURGERY)

## 2018-01-01 PROCEDURE — 85384 FIBRINOGEN ACTIVITY: CPT | Performed by: THORACIC SURGERY (CARDIOTHORACIC VASCULAR SURGERY)

## 2018-01-01 PROCEDURE — 93005 ELECTROCARDIOGRAM TRACING: CPT

## 2018-01-01 PROCEDURE — 07B70ZX EXCISION OF THORAX LYMPHATIC, OPEN APPROACH, DIAGNOSTIC: ICD-10-PCS | Performed by: THORACIC SURGERY (CARDIOTHORACIC VASCULAR SURGERY)

## 2018-01-01 PROCEDURE — 85027 COMPLETE CBC AUTOMATED: CPT | Performed by: INTERNAL MEDICINE

## 2018-01-01 PROCEDURE — 71045 X-RAY EXAM CHEST 1 VIEW: CPT | Mod: FY

## 2018-01-01 PROCEDURE — 88331 PATH CONSLTJ SURG 1 BLK 1SPC: CPT | Mod: 26 | Performed by: THORACIC SURGERY (CARDIOTHORACIC VASCULAR SURGERY)

## 2018-01-01 PROCEDURE — 88342 IMHCHEM/IMCYTCHM 1ST ANTB: CPT | Mod: 26 | Performed by: THORACIC SURGERY (CARDIOTHORACIC VASCULAR SURGERY)

## 2018-01-01 PROCEDURE — 25000132 ZZH RX MED GY IP 250 OP 250 PS 637: Mod: GY | Performed by: NURSE PRACTITIONER

## 2018-01-01 PROCEDURE — A9270 NON-COVERED ITEM OR SERVICE: HCPCS | Mod: GY | Performed by: NURSE PRACTITIONER

## 2018-01-01 PROCEDURE — 40000986 XR CHEST PORT 1 VW

## 2018-01-01 PROCEDURE — 25000128 H RX IP 250 OP 636: Performed by: ANESTHESIOLOGY

## 2018-01-01 PROCEDURE — 40000256 ZZH STATISTIC CARDIOPULM RESUSCITATION

## 2018-01-01 PROCEDURE — P9016 RBC LEUKOCYTES REDUCED: HCPCS | Performed by: THORACIC SURGERY (CARDIOTHORACIC VASCULAR SURGERY)

## 2018-01-01 PROCEDURE — 86923 COMPATIBILITY TEST ELECTRIC: CPT | Performed by: THORACIC SURGERY (CARDIOTHORACIC VASCULAR SURGERY)

## 2018-01-01 PROCEDURE — 80053 COMPREHEN METABOLIC PANEL: CPT | Performed by: THORACIC SURGERY (CARDIOTHORACIC VASCULAR SURGERY)

## 2018-01-01 PROCEDURE — 36415 COLL VENOUS BLD VENIPUNCTURE: CPT | Performed by: ANESTHESIOLOGY

## 2018-01-01 PROCEDURE — 71000013 ZZH RECOVERY PHASE 1 LEVEL 1 EA ADDTL HR: Performed by: THORACIC SURGERY (CARDIOTHORACIC VASCULAR SURGERY)

## 2018-01-01 PROCEDURE — 99207 ZZC CONSULT E&M CHANGED TO INITIAL LEVEL: CPT | Performed by: NURSE PRACTITIONER

## 2018-01-01 PROCEDURE — 85018 HEMOGLOBIN: CPT | Performed by: PHYSICIAN ASSISTANT

## 2018-01-01 PROCEDURE — 88341 IMHCHEM/IMCYTCHM EA ADD ANTB: CPT | Performed by: THORACIC SURGERY (CARDIOTHORACIC VASCULAR SURGERY)

## 2018-01-01 PROCEDURE — 80048 BASIC METABOLIC PNL TOTAL CA: CPT | Performed by: INTERNAL MEDICINE

## 2018-01-01 PROCEDURE — 84484 ASSAY OF TROPONIN QUANT: CPT | Performed by: THORACIC SURGERY (CARDIOTHORACIC VASCULAR SURGERY)

## 2018-01-01 PROCEDURE — 71000012 ZZH RECOVERY PHASE 1 LEVEL 1 FIRST HR: Performed by: THORACIC SURGERY (CARDIOTHORACIC VASCULAR SURGERY)

## 2018-01-01 PROCEDURE — 0BBF0ZX EXCISION OF RIGHT LOWER LUNG LOBE, OPEN APPROACH, DIAGNOSTIC: ICD-10-PCS | Performed by: THORACIC SURGERY (CARDIOTHORACIC VASCULAR SURGERY)

## 2018-01-01 PROCEDURE — 25000132 ZZH RX MED GY IP 250 OP 250 PS 637: Mod: GY | Performed by: PHYSICIAN ASSISTANT

## 2018-01-01 PROCEDURE — 36000063 ZZH SURGERY LEVEL 4 EA 15 ADDTL MIN: Performed by: THORACIC SURGERY (CARDIOTHORACIC VASCULAR SURGERY)

## 2018-01-01 PROCEDURE — 85027 COMPLETE CBC AUTOMATED: CPT | Performed by: THORACIC SURGERY (CARDIOTHORACIC VASCULAR SURGERY)

## 2018-01-01 PROCEDURE — 88305 TISSUE EXAM BY PATHOLOGIST: CPT | Performed by: THORACIC SURGERY (CARDIOTHORACIC VASCULAR SURGERY)

## 2018-01-01 PROCEDURE — 86850 RBC ANTIBODY SCREEN: CPT | Performed by: THORACIC SURGERY (CARDIOTHORACIC VASCULAR SURGERY)

## 2018-01-01 PROCEDURE — 88342 IMHCHEM/IMCYTCHM 1ST ANTB: CPT | Performed by: THORACIC SURGERY (CARDIOTHORACIC VASCULAR SURGERY)

## 2018-01-01 PROCEDURE — 85610 PROTHROMBIN TIME: CPT | Performed by: THORACIC SURGERY (CARDIOTHORACIC VASCULAR SURGERY)

## 2018-01-01 PROCEDURE — 36415 COLL VENOUS BLD VENIPUNCTURE: CPT | Performed by: INTERNAL MEDICINE

## 2018-01-01 PROCEDURE — 99207 ZZC CDG-MDM COMPONENT: MEETS MODERATE - UP CODED: CPT | Performed by: INTERNAL MEDICINE

## 2018-01-01 PROCEDURE — 71000015 ZZH RECOVERY PHASE 1 LEVEL 2 EA ADDTL HR: Performed by: THORACIC SURGERY (CARDIOTHORACIC VASCULAR SURGERY)

## 2018-01-01 PROCEDURE — 80048 BASIC METABOLIC PNL TOTAL CA: CPT | Performed by: PHYSICIAN ASSISTANT

## 2018-01-01 PROCEDURE — 86901 BLOOD TYPING SEROLOGIC RH(D): CPT | Performed by: THORACIC SURGERY (CARDIOTHORACIC VASCULAR SURGERY)

## 2018-01-01 PROCEDURE — 31500 INSERT EMERGENCY AIRWAY: CPT | Performed by: NURSE ANESTHETIST, CERTIFIED REGISTERED

## 2018-01-01 PROCEDURE — 71000014 ZZH RECOVERY PHASE 1 LEVEL 2 FIRST HR: Performed by: THORACIC SURGERY (CARDIOTHORACIC VASCULAR SURGERY)

## 2018-01-01 PROCEDURE — 37000008 ZZH ANESTHESIA TECHNICAL FEE, 1ST 30 MIN: Performed by: THORACIC SURGERY (CARDIOTHORACIC VASCULAR SURGERY)

## 2018-01-01 PROCEDURE — 27110038 ZZH RX 271: Performed by: THORACIC SURGERY (CARDIOTHORACIC VASCULAR SURGERY)

## 2018-01-01 PROCEDURE — 31500 INSERT EMERGENCY AIRWAY: CPT

## 2018-01-01 PROCEDURE — 0W9930Z DRAINAGE OF RIGHT PLEURAL CAVITY WITH DRAINAGE DEVICE, PERCUTANEOUS APPROACH: ICD-10-PCS | Performed by: THORACIC SURGERY (CARDIOTHORACIC VASCULAR SURGERY)

## 2018-01-01 PROCEDURE — 83605 ASSAY OF LACTIC ACID: CPT | Performed by: THORACIC SURGERY (CARDIOTHORACIC VASCULAR SURGERY)

## 2018-01-01 PROCEDURE — 37000009 ZZH ANESTHESIA TECHNICAL FEE, EACH ADDTL 15 MIN: Performed by: THORACIC SURGERY (CARDIOTHORACIC VASCULAR SURGERY)

## 2018-01-01 PROCEDURE — 40000170 ZZH STATISTIC PRE-PROCEDURE ASSESSMENT II: Performed by: THORACIC SURGERY (CARDIOTHORACIC VASCULAR SURGERY)

## 2018-01-01 PROCEDURE — 85730 THROMBOPLASTIN TIME PARTIAL: CPT | Performed by: THORACIC SURGERY (CARDIOTHORACIC VASCULAR SURGERY)

## 2018-01-01 PROCEDURE — 93010 ELECTROCARDIOGRAM REPORT: CPT | Performed by: INTERNAL MEDICINE

## 2018-01-01 PROCEDURE — 25000566 ZZH SEVOFLURANE, EA 15 MIN: Performed by: THORACIC SURGERY (CARDIOTHORACIC VASCULAR SURGERY)

## 2018-01-01 PROCEDURE — 36000093 ZZH SURGERY LEVEL 4 1ST 30 MIN: Performed by: THORACIC SURGERY (CARDIOTHORACIC VASCULAR SURGERY)

## 2018-01-01 PROCEDURE — 84295 ASSAY OF SERUM SODIUM: CPT | Performed by: THORACIC SURGERY (CARDIOTHORACIC VASCULAR SURGERY)

## 2018-01-01 PROCEDURE — 94660 CPAP INITIATION&MGMT: CPT

## 2018-01-01 PROCEDURE — 97161 PT EVAL LOW COMPLEX 20 MIN: CPT | Mod: GP | Performed by: PHYSICAL THERAPIST

## 2018-01-01 PROCEDURE — 97530 THERAPEUTIC ACTIVITIES: CPT | Mod: GP | Performed by: PHYSICAL THERAPIST

## 2018-01-01 PROCEDURE — 00000146 ZZHCL STATISTIC GLUCOSE BY METER IP

## 2018-01-01 PROCEDURE — 88309 TISSUE EXAM BY PATHOLOGIST: CPT | Performed by: THORACIC SURGERY (CARDIOTHORACIC VASCULAR SURGERY)

## 2018-01-01 PROCEDURE — 0BTF0ZZ RESECTION OF RIGHT LOWER LUNG LOBE, OPEN APPROACH: ICD-10-PCS | Performed by: THORACIC SURGERY (CARDIOTHORACIC VASCULAR SURGERY)

## 2018-01-01 PROCEDURE — 83735 ASSAY OF MAGNESIUM: CPT | Performed by: THORACIC SURGERY (CARDIOTHORACIC VASCULAR SURGERY)

## 2018-01-01 PROCEDURE — 71045 X-RAY EXAM CHEST 1 VIEW: CPT

## 2018-01-01 PROCEDURE — 88305 TISSUE EXAM BY PATHOLOGIST: CPT | Mod: 26,76 | Performed by: THORACIC SURGERY (CARDIOTHORACIC VASCULAR SURGERY)

## 2018-01-01 PROCEDURE — 40000193 ZZH STATISTIC PT WARD VISIT: Performed by: PHYSICAL THERAPIST

## 2018-01-01 PROCEDURE — 99222 1ST HOSP IP/OBS MODERATE 55: CPT | Performed by: NURSE PRACTITIONER

## 2018-01-01 PROCEDURE — 25000125 ZZHC RX 250: Performed by: NURSE ANESTHETIST, CERTIFIED REGISTERED

## 2018-01-01 PROCEDURE — 88331 PATH CONSLTJ SURG 1 BLK 1SPC: CPT | Performed by: THORACIC SURGERY (CARDIOTHORACIC VASCULAR SURGERY)

## 2018-01-01 PROCEDURE — 27210338 ZZH CIRCUIT HUMID FACE/TRACH MSK

## 2018-01-01 PROCEDURE — 27210995 ZZH RX 272: Performed by: THORACIC SURGERY (CARDIOTHORACIC VASCULAR SURGERY)

## 2018-01-01 PROCEDURE — 25000128 H RX IP 250 OP 636: Performed by: NURSE ANESTHETIST, CERTIFIED REGISTERED

## 2018-01-01 PROCEDURE — 27210794 ZZH OR GENERAL SUPPLY STERILE: Performed by: THORACIC SURGERY (CARDIOTHORACIC VASCULAR SURGERY)

## 2018-01-01 PROCEDURE — 40000885 ZZH STATISTIC STEP DOWN HRS EVENING

## 2018-01-01 RX ORDER — FENTANYL CITRATE 0.05 MG/ML
25-50 INJECTION, SOLUTION INTRAMUSCULAR; INTRAVENOUS
Status: DISCONTINUED | OUTPATIENT
Start: 2018-01-01 | End: 2018-01-01 | Stop reason: HOSPADM

## 2018-01-01 RX ORDER — ALBUTEROL SULFATE 90 UG/1
2 AEROSOL, METERED RESPIRATORY (INHALATION) EVERY 6 HOURS PRN
Status: DISCONTINUED | OUTPATIENT
Start: 2018-01-01 | End: 2018-01-09 | Stop reason: HOSPADM

## 2018-01-01 RX ORDER — LOSARTAN POTASSIUM AND HYDROCHLOROTHIAZIDE 12.5; 5 MG/1; MG/1
1 TABLET ORAL AT BEDTIME
Status: DISCONTINUED | OUTPATIENT
Start: 2018-01-01 | End: 2018-01-01

## 2018-01-01 RX ORDER — HYDROMORPHONE HYDROCHLORIDE 1 MG/ML
.3-.5 INJECTION, SOLUTION INTRAMUSCULAR; INTRAVENOUS; SUBCUTANEOUS EVERY 5 MIN PRN
Status: DISCONTINUED | OUTPATIENT
Start: 2018-01-01 | End: 2018-01-01 | Stop reason: HOSPADM

## 2018-01-01 RX ORDER — CALCIUM CARBONATE 500 MG/1
1000 TABLET, CHEWABLE ORAL 4 TIMES DAILY PRN
Status: DISCONTINUED | OUTPATIENT
Start: 2018-01-01 | End: 2018-01-09 | Stop reason: HOSPADM

## 2018-01-01 RX ORDER — DEXAMETHASONE SODIUM PHOSPHATE 4 MG/ML
INJECTION, SOLUTION INTRA-ARTICULAR; INTRALESIONAL; INTRAMUSCULAR; INTRAVENOUS; SOFT TISSUE PRN
Status: DISCONTINUED | OUTPATIENT
Start: 2018-01-01 | End: 2018-01-01

## 2018-01-01 RX ORDER — MAGNESIUM HYDROXIDE 1200 MG/15ML
LIQUID ORAL PRN
Status: DISCONTINUED | OUTPATIENT
Start: 2018-01-01 | End: 2018-01-01 | Stop reason: HOSPADM

## 2018-01-01 RX ORDER — LIDOCAINE 40 MG/G
CREAM TOPICAL
Status: DISCONTINUED | OUTPATIENT
Start: 2018-01-01 | End: 2018-01-01

## 2018-01-01 RX ORDER — CEFAZOLIN SODIUM 1 G/3ML
INJECTION, POWDER, FOR SOLUTION INTRAMUSCULAR; INTRAVENOUS PRN
Status: DISCONTINUED | OUTPATIENT
Start: 2018-01-01 | End: 2018-01-01

## 2018-01-01 RX ORDER — NALOXONE HYDROCHLORIDE 0.4 MG/ML
.1-.4 INJECTION, SOLUTION INTRAMUSCULAR; INTRAVENOUS; SUBCUTANEOUS
Status: DISCONTINUED | OUTPATIENT
Start: 2018-01-01 | End: 2018-01-09 | Stop reason: HOSPADM

## 2018-01-01 RX ORDER — AMOXICILLIN 250 MG
1 CAPSULE ORAL 2 TIMES DAILY PRN
Status: DISCONTINUED | OUTPATIENT
Start: 2018-01-01 | End: 2018-01-01

## 2018-01-01 RX ORDER — ROPINIROLE 1 MG/1
2 TABLET, FILM COATED ORAL AT BEDTIME
Status: DISCONTINUED | OUTPATIENT
Start: 2018-01-01 | End: 2018-01-09 | Stop reason: HOSPADM

## 2018-01-01 RX ORDER — FUROSEMIDE 20 MG/1
10 TABLET ORAL ONCE
Status: DISCONTINUED | OUTPATIENT
Start: 2018-01-01 | End: 2018-01-01

## 2018-01-01 RX ORDER — ONDANSETRON 2 MG/ML
4 INJECTION INTRAMUSCULAR; INTRAVENOUS EVERY 30 MIN PRN
Status: DISCONTINUED | OUTPATIENT
Start: 2018-01-01 | End: 2018-01-01 | Stop reason: HOSPADM

## 2018-01-01 RX ORDER — ALBUMIN, HUMAN INJ 5% 5 %
500 SOLUTION INTRAVENOUS ONCE
Status: COMPLETED | OUTPATIENT
Start: 2018-01-01 | End: 2018-01-01

## 2018-01-01 RX ORDER — FUROSEMIDE 10 MG/ML
10 INJECTION INTRAMUSCULAR; INTRAVENOUS ONCE
Status: COMPLETED | OUTPATIENT
Start: 2018-01-01 | End: 2018-01-01

## 2018-01-01 RX ORDER — IPRATROPIUM BROMIDE AND ALBUTEROL SULFATE 2.5; .5 MG/3ML; MG/3ML
3 SOLUTION RESPIRATORY (INHALATION) ONCE
Status: COMPLETED | OUTPATIENT
Start: 2018-01-01 | End: 2018-01-01

## 2018-01-01 RX ORDER — SODIUM CHLORIDE 9 MG/ML
INJECTION, SOLUTION INTRAVENOUS CONTINUOUS
Status: DISCONTINUED | OUTPATIENT
Start: 2018-01-01 | End: 2018-01-01

## 2018-01-01 RX ORDER — HYDROMORPHONE HYDROCHLORIDE 2 MG/1
2-4 TABLET ORAL
Status: DISCONTINUED | OUTPATIENT
Start: 2018-01-01 | End: 2018-01-01

## 2018-01-01 RX ORDER — ONDANSETRON 4 MG/1
4 TABLET, ORALLY DISINTEGRATING ORAL EVERY 30 MIN PRN
Status: DISCONTINUED | OUTPATIENT
Start: 2018-01-01 | End: 2018-01-01 | Stop reason: HOSPADM

## 2018-01-01 RX ORDER — SODIUM CHLORIDE, SODIUM LACTATE, POTASSIUM CHLORIDE, CALCIUM CHLORIDE 600; 310; 30; 20 MG/100ML; MG/100ML; MG/100ML; MG/100ML
INJECTION, SOLUTION INTRAVENOUS CONTINUOUS
Status: DISCONTINUED | OUTPATIENT
Start: 2018-01-01 | End: 2018-01-01 | Stop reason: HOSPADM

## 2018-01-01 RX ORDER — ONDANSETRON 4 MG/1
4 TABLET, ORALLY DISINTEGRATING ORAL EVERY 6 HOURS PRN
Status: DISCONTINUED | OUTPATIENT
Start: 2018-01-01 | End: 2018-01-09 | Stop reason: HOSPADM

## 2018-01-01 RX ORDER — ETOMIDATE 2 MG/ML
INJECTION INTRAVENOUS PRN
Status: DISCONTINUED | OUTPATIENT
Start: 2018-01-01 | End: 2018-01-01

## 2018-01-01 RX ORDER — EPHEDRINE SULFATE 50 MG/ML
INJECTION, SOLUTION INTRAMUSCULAR; INTRAVENOUS; SUBCUTANEOUS PRN
Status: DISCONTINUED | OUTPATIENT
Start: 2018-01-01 | End: 2018-01-01

## 2018-01-01 RX ORDER — PROPOFOL 10 MG/ML
INJECTION, EMULSION INTRAVENOUS PRN
Status: DISCONTINUED | OUTPATIENT
Start: 2018-01-01 | End: 2018-01-01

## 2018-01-01 RX ORDER — GLYCOPYRROLATE 0.2 MG/ML
INJECTION, SOLUTION INTRAMUSCULAR; INTRAVENOUS PRN
Status: DISCONTINUED | OUTPATIENT
Start: 2018-01-01 | End: 2018-01-01

## 2018-01-01 RX ORDER — NITROGLYCERIN 0.4 MG/1
0.4 TABLET SUBLINGUAL EVERY 5 MIN PRN
Status: DISCONTINUED | OUTPATIENT
Start: 2018-01-01 | End: 2018-01-09 | Stop reason: HOSPADM

## 2018-01-01 RX ORDER — CEFAZOLIN SODIUM 1 G/50ML
3 SOLUTION INTRAVENOUS
Status: COMPLETED | OUTPATIENT
Start: 2018-01-01 | End: 2018-01-01

## 2018-01-01 RX ORDER — LIDOCAINE HYDROCHLORIDE 20 MG/ML
INJECTION, SOLUTION INFILTRATION; PERINEURAL PRN
Status: DISCONTINUED | OUTPATIENT
Start: 2018-01-01 | End: 2018-01-01

## 2018-01-01 RX ORDER — NALOXONE HYDROCHLORIDE 0.4 MG/ML
.1-.4 INJECTION, SOLUTION INTRAMUSCULAR; INTRAVENOUS; SUBCUTANEOUS
Status: DISCONTINUED | OUTPATIENT
Start: 2018-01-01 | End: 2018-01-01

## 2018-01-01 RX ORDER — OXYBUTYNIN CHLORIDE 10 MG/1
10 TABLET, EXTENDED RELEASE ORAL DAILY
Status: DISCONTINUED | OUTPATIENT
Start: 2018-01-01 | End: 2018-01-09 | Stop reason: HOSPADM

## 2018-01-01 RX ORDER — ONDANSETRON 2 MG/ML
4 INJECTION INTRAMUSCULAR; INTRAVENOUS EVERY 6 HOURS PRN
Status: DISCONTINUED | OUTPATIENT
Start: 2018-01-01 | End: 2018-01-09 | Stop reason: HOSPADM

## 2018-01-01 RX ORDER — DIPHENHYDRAMINE HYDROCHLORIDE 50 MG/ML
12.5 INJECTION INTRAMUSCULAR; INTRAVENOUS EVERY 6 HOURS PRN
Status: DISCONTINUED | OUTPATIENT
Start: 2018-01-01 | End: 2018-01-09 | Stop reason: HOSPADM

## 2018-01-01 RX ORDER — NITROGLYCERIN 0.4 MG/1
0.4 TABLET SUBLINGUAL EVERY 5 MIN PRN
Status: DISCONTINUED | OUTPATIENT
Start: 2018-01-01 | End: 2018-01-01

## 2018-01-01 RX ORDER — HYDROMORPHONE HYDROCHLORIDE 1 MG/ML
.3-.5 INJECTION, SOLUTION INTRAMUSCULAR; INTRAVENOUS; SUBCUTANEOUS
Status: DISCONTINUED | OUTPATIENT
Start: 2018-01-01 | End: 2018-01-01

## 2018-01-01 RX ORDER — NYSTATIN 100000 U/G
CREAM TOPICAL 2 TIMES DAILY
Status: DISCONTINUED | OUTPATIENT
Start: 2018-01-01 | End: 2018-01-09 | Stop reason: HOSPADM

## 2018-01-01 RX ORDER — GINSENG 100 MG
CAPSULE ORAL 3 TIMES DAILY
Status: DISCONTINUED | OUTPATIENT
Start: 2018-01-01 | End: 2018-01-09 | Stop reason: HOSPADM

## 2018-01-01 RX ORDER — AMOXICILLIN 250 MG
2 CAPSULE ORAL 2 TIMES DAILY PRN
Status: DISCONTINUED | OUTPATIENT
Start: 2018-01-01 | End: 2018-01-01

## 2018-01-01 RX ORDER — CEFAZOLIN SODIUM 1 G
1 VIAL (EA) INJECTION SEE ADMIN INSTRUCTIONS
Status: DISCONTINUED | OUTPATIENT
Start: 2018-01-01 | End: 2018-01-01 | Stop reason: HOSPADM

## 2018-01-01 RX ORDER — FENTANYL CITRATE 50 UG/ML
INJECTION, SOLUTION INTRAMUSCULAR; INTRAVENOUS PRN
Status: DISCONTINUED | OUTPATIENT
Start: 2018-01-01 | End: 2018-01-01

## 2018-01-01 RX ORDER — BISACODYL 10 MG
10 SUPPOSITORY, RECTAL RECTAL DAILY PRN
Status: DISCONTINUED | OUTPATIENT
Start: 2018-01-01 | End: 2018-01-09 | Stop reason: HOSPADM

## 2018-01-01 RX ORDER — MUPIROCIN 20 MG/G
0.5 OINTMENT TOPICAL 3 TIMES DAILY PRN
COMMUNITY

## 2018-01-01 RX ORDER — ATORVASTATIN CALCIUM 20 MG/1
20 TABLET, FILM COATED ORAL AT BEDTIME
Status: DISCONTINUED | OUTPATIENT
Start: 2018-01-01 | End: 2018-01-09 | Stop reason: HOSPADM

## 2018-01-01 RX ORDER — AMOXICILLIN 250 MG
1-2 CAPSULE ORAL 2 TIMES DAILY
Status: DISCONTINUED | OUTPATIENT
Start: 2018-01-01 | End: 2018-01-09 | Stop reason: HOSPADM

## 2018-01-01 RX ORDER — SODIUM CHLORIDE 9 MG/ML
INJECTION, SOLUTION INTRAVENOUS CONTINUOUS PRN
Status: DISCONTINUED | OUTPATIENT
Start: 2018-01-01 | End: 2018-01-01

## 2018-01-01 RX ORDER — LOSARTAN POTASSIUM 50 MG/1
50 TABLET ORAL DAILY
Status: DISCONTINUED | OUTPATIENT
Start: 2018-01-01 | End: 2018-01-01

## 2018-01-01 RX ORDER — LIDOCAINE 40 MG/G
CREAM TOPICAL
Status: DISCONTINUED | OUTPATIENT
Start: 2018-01-01 | End: 2018-01-09 | Stop reason: HOSPADM

## 2018-01-01 RX ORDER — AMOXICILLIN 250 MG
1 CAPSULE ORAL 2 TIMES DAILY PRN
Status: DISCONTINUED | OUTPATIENT
Start: 2018-01-01 | End: 2018-01-09 | Stop reason: HOSPADM

## 2018-01-01 RX ORDER — IBUPROFEN 200 MG
200 TABLET ORAL 4 TIMES DAILY
Status: DISCONTINUED | OUTPATIENT
Start: 2018-01-01 | End: 2018-01-01

## 2018-01-01 RX ORDER — LOSARTAN POTASSIUM AND HYDROCHLOROTHIAZIDE 12.5; 5 MG/1; MG/1
1 TABLET ORAL DAILY
COMMUNITY

## 2018-01-01 RX ORDER — BUPROPION HYDROCHLORIDE 150 MG/1
300 TABLET ORAL EVERY MORNING
Status: DISCONTINUED | OUTPATIENT
Start: 2018-01-01 | End: 2018-01-09 | Stop reason: HOSPADM

## 2018-01-01 RX ORDER — FENTANYL CITRATE 50 UG/ML
25-50 INJECTION, SOLUTION INTRAMUSCULAR; INTRAVENOUS
Status: DISCONTINUED | OUTPATIENT
Start: 2018-01-01 | End: 2018-01-01 | Stop reason: HOSPADM

## 2018-01-01 RX ORDER — DIPHENHYDRAMINE HYDROCHLORIDE 50 MG/ML
INJECTION INTRAMUSCULAR; INTRAVENOUS PRN
Status: DISCONTINUED | OUTPATIENT
Start: 2018-01-01 | End: 2018-01-01

## 2018-01-01 RX ORDER — ROPINIROLE 1 MG/1
1 TABLET, FILM COATED ORAL EVERY MORNING
Status: DISCONTINUED | OUTPATIENT
Start: 2018-01-01 | End: 2018-01-09 | Stop reason: HOSPADM

## 2018-01-01 RX ORDER — ACETAMINOPHEN 325 MG/1
975 TABLET ORAL EVERY 8 HOURS
Status: DISPENSED | OUTPATIENT
Start: 2018-01-01 | End: 2018-01-01

## 2018-01-01 RX ORDER — ALBUMIN, HUMAN INJ 5% 5 %
12.5 SOLUTION INTRAVENOUS ONCE
Status: COMPLETED | OUTPATIENT
Start: 2018-01-01 | End: 2018-01-01

## 2018-01-01 RX ORDER — NYSTATIN 100000 U/G
CREAM TOPICAL 2 TIMES DAILY
COMMUNITY

## 2018-01-01 RX ORDER — FUROSEMIDE 20 MG/1
10 TABLET ORAL DAILY
Status: DISCONTINUED | OUTPATIENT
Start: 2018-01-01 | End: 2018-01-01

## 2018-01-01 RX ORDER — DIPHENHYDRAMINE HCL 12.5MG/5ML
12.5 LIQUID (ML) ORAL EVERY 6 HOURS PRN
Status: DISCONTINUED | OUTPATIENT
Start: 2018-01-01 | End: 2018-01-09 | Stop reason: HOSPADM

## 2018-01-01 RX ORDER — BUPIVACAINE HYDROCHLORIDE 5 MG/ML
INJECTION, SOLUTION PERINEURAL PRN
Status: DISCONTINUED | OUTPATIENT
Start: 2018-01-01 | End: 2018-01-01 | Stop reason: HOSPADM

## 2018-01-01 RX ORDER — ONDANSETRON 2 MG/ML
INJECTION INTRAMUSCULAR; INTRAVENOUS PRN
Status: DISCONTINUED | OUTPATIENT
Start: 2018-01-01 | End: 2018-01-01

## 2018-01-01 RX ORDER — PROCHLORPERAZINE MALEATE 5 MG
5 TABLET ORAL EVERY 6 HOURS PRN
Status: DISCONTINUED | OUTPATIENT
Start: 2018-01-01 | End: 2018-01-09 | Stop reason: HOSPADM

## 2018-01-01 RX ORDER — HYDRALAZINE HYDROCHLORIDE 20 MG/ML
10 INJECTION INTRAMUSCULAR; INTRAVENOUS EVERY 4 HOURS PRN
Status: DISCONTINUED | OUTPATIENT
Start: 2018-01-01 | End: 2018-01-09 | Stop reason: HOSPADM

## 2018-01-01 RX ORDER — AMOXICILLIN 250 MG
2 CAPSULE ORAL 2 TIMES DAILY PRN
Status: DISCONTINUED | OUTPATIENT
Start: 2018-01-01 | End: 2018-01-09 | Stop reason: HOSPADM

## 2018-01-01 RX ORDER — ACETAMINOPHEN 325 MG/1
650 TABLET ORAL EVERY 4 HOURS PRN
Status: DISCONTINUED | OUTPATIENT
Start: 2018-01-01 | End: 2018-01-09 | Stop reason: HOSPADM

## 2018-01-01 RX ORDER — NEOSTIGMINE METHYLSULFATE 1 MG/ML
VIAL (ML) INJECTION PRN
Status: DISCONTINUED | OUTPATIENT
Start: 2018-01-01 | End: 2018-01-01

## 2018-01-01 RX ADMIN — NYSTATIN: 100000 CREAM TOPICAL at 22:18

## 2018-01-01 RX ADMIN — LOSARTAN POTASSIUM 50 MG: 50 TABLET ORAL at 08:27

## 2018-01-01 RX ADMIN — MIDAZOLAM 1 MG: 1 INJECTION INTRAMUSCULAR; INTRAVENOUS at 09:45

## 2018-01-01 RX ADMIN — Medication 0.3 MCG/KG/MIN: at 14:12

## 2018-01-01 RX ADMIN — ROCURONIUM BROMIDE 40 MG: 10 INJECTION INTRAVENOUS at 09:35

## 2018-01-01 RX ADMIN — ATORVASTATIN CALCIUM 20 MG: 20 TABLET, FILM COATED ORAL at 22:17

## 2018-01-01 RX ADMIN — MIDAZOLAM 0.5 MG: 1 INJECTION INTRAMUSCULAR; INTRAVENOUS at 09:26

## 2018-01-01 RX ADMIN — SENNOSIDES AND DOCUSATE SODIUM 2 TABLET: 8.6; 5 TABLET ORAL at 08:26

## 2018-01-01 RX ADMIN — Medication 100 MCG: at 15:03

## 2018-01-01 RX ADMIN — OMEPRAZOLE 20 MG: 20 CAPSULE, DELAYED RELEASE ORAL at 10:24

## 2018-01-01 RX ADMIN — ALBUTEROL SULFATE 2 PUFF: 90 INHALANT RESPIRATORY (INHALATION) at 06:08

## 2018-01-01 RX ADMIN — HYDROMORPHONE HYDROCHLORIDE 2 MG: 2 TABLET ORAL at 06:11

## 2018-01-01 RX ADMIN — BUPIVACAINE HYDROCHLORIDE 30 ML: 5 INJECTION, SOLUTION PERINEURAL at 14:50

## 2018-01-01 RX ADMIN — Medication 200 MCG: at 14:26

## 2018-01-01 RX ADMIN — Medication 50 MG: at 14:10

## 2018-01-01 RX ADMIN — SENNOSIDES AND DOCUSATE SODIUM 2 TABLET: 8.6; 5 TABLET ORAL at 09:31

## 2018-01-01 RX ADMIN — Medication 20 MG: at 14:29

## 2018-01-01 RX ADMIN — ROPINIROLE HYDROCHLORIDE 1 MG: 1 TABLET, FILM COATED ORAL at 10:23

## 2018-01-01 RX ADMIN — BUPROPION HYDROCHLORIDE 300 MG: 150 TABLET, FILM COATED, EXTENDED RELEASE ORAL at 09:31

## 2018-01-01 RX ADMIN — SODIUM CHLORIDE 1000 ML: 900 IRRIGANT IRRIGATION at 10:50

## 2018-01-01 RX ADMIN — FENTANYL CITRATE 50 MCG: 50 INJECTION, SOLUTION INTRAMUSCULAR; INTRAVENOUS at 09:58

## 2018-01-01 RX ADMIN — SODIUM CHLORIDE: 9 INJECTION, SOLUTION INTRAVENOUS at 06:06

## 2018-01-01 RX ADMIN — SODIUM CHLORIDE 1000 ML: 900 IRRIGANT IRRIGATION at 14:37

## 2018-01-01 RX ADMIN — NYSTATIN: 100000 CREAM TOPICAL at 20:47

## 2018-01-01 RX ADMIN — EPHEDRINE SULFATE 5 MG: 50 INJECTION, SOLUTION INTRAMUSCULAR; INTRAVENOUS; SUBCUTANEOUS at 14:38

## 2018-01-01 RX ADMIN — DIPHENHYDRAMINE HYDROCHLORIDE 12.5 MG: 50 INJECTION, SOLUTION INTRAMUSCULAR; INTRAVENOUS at 10:00

## 2018-01-01 RX ADMIN — FLUOXETINE 40 MG: 20 CAPSULE ORAL at 08:26

## 2018-01-01 RX ADMIN — PROPOFOL 150 MG: 10 INJECTION, EMULSION INTRAVENOUS at 09:35

## 2018-01-01 RX ADMIN — Medication 0.3 MG: at 16:20

## 2018-01-01 RX ADMIN — SODIUM CHLORIDE, POTASSIUM CHLORIDE, SODIUM LACTATE AND CALCIUM CHLORIDE: 600; 310; 30; 20 INJECTION, SOLUTION INTRAVENOUS at 08:58

## 2018-01-01 RX ADMIN — ACETAMINOPHEN 975 MG: 325 TABLET, FILM COATED ORAL at 22:18

## 2018-01-01 RX ADMIN — ROPINIROLE HYDROCHLORIDE 1 MG: 1 TABLET, FILM COATED ORAL at 08:26

## 2018-01-01 RX ADMIN — SODIUM CHLORIDE: 9 INJECTION, SOLUTION INTRAVENOUS at 14:18

## 2018-01-01 RX ADMIN — BUPROPION HYDROCHLORIDE 300 MG: 150 TABLET, FILM COATED, EXTENDED RELEASE ORAL at 10:23

## 2018-01-01 RX ADMIN — FENTANYL CITRATE 25 MCG: 50 INJECTION, SOLUTION INTRAMUSCULAR; INTRAVENOUS at 11:26

## 2018-01-01 RX ADMIN — ROCURONIUM BROMIDE 20 MG: 10 INJECTION INTRAVENOUS at 09:48

## 2018-01-01 RX ADMIN — EPHEDRINE SULFATE 5 MG: 50 INJECTION, SOLUTION INTRAMUSCULAR; INTRAVENOUS; SUBCUTANEOUS at 14:37

## 2018-01-01 RX ADMIN — HYDROMORPHONE HYDROCHLORIDE 2 MG: 2 TABLET ORAL at 10:29

## 2018-01-01 RX ADMIN — ALBUMIN HUMAN 12.5 G: 0.05 INJECTION, SOLUTION INTRAVENOUS at 13:09

## 2018-01-01 RX ADMIN — Medication 100 MCG: at 15:23

## 2018-01-01 RX ADMIN — DEXMEDETOMIDINE HYDROCHLORIDE 12 MCG: 100 INJECTION, SOLUTION INTRAVENOUS at 10:00

## 2018-01-01 RX ADMIN — FUROSEMIDE 10 MG: 10 INJECTION, SOLUTION INTRAVENOUS at 13:30

## 2018-01-01 RX ADMIN — ACETAMINOPHEN 975 MG: 325 TABLET, FILM COATED ORAL at 13:32

## 2018-01-01 RX ADMIN — EPHEDRINE SULFATE 7.5 MG: 50 INJECTION, SOLUTION INTRAMUSCULAR; INTRAVENOUS; SUBCUTANEOUS at 15:01

## 2018-01-01 RX ADMIN — NYSTATIN: 100000 CREAM TOPICAL at 10:24

## 2018-01-01 RX ADMIN — EPHEDRINE SULFATE 5 MG: 50 INJECTION, SOLUTION INTRAMUSCULAR; INTRAVENOUS; SUBCUTANEOUS at 14:10

## 2018-01-01 RX ADMIN — FENTANYL CITRATE 25 MCG: 50 INJECTION, SOLUTION INTRAMUSCULAR; INTRAVENOUS at 11:31

## 2018-01-01 RX ADMIN — NYSTATIN: 100000 CREAM TOPICAL at 13:29

## 2018-01-01 RX ADMIN — ROPINIROLE HYDROCHLORIDE 2 MG: 1 TABLET, FILM COATED ORAL at 22:15

## 2018-01-01 RX ADMIN — ENOXAPARIN SODIUM 40 MG: 40 INJECTION SUBCUTANEOUS at 22:16

## 2018-01-01 RX ADMIN — ACETAMINOPHEN 975 MG: 325 TABLET, FILM COATED ORAL at 13:38

## 2018-01-01 RX ADMIN — CEFAZOLIN SODIUM 3 G: 1 INJECTION, POWDER, FOR SOLUTION INTRAMUSCULAR; INTRAVENOUS at 14:22

## 2018-01-01 RX ADMIN — FENTANYL CITRATE 25 MCG: 50 INJECTION, SOLUTION INTRAMUSCULAR; INTRAVENOUS at 11:23

## 2018-01-01 RX ADMIN — PHENYLEPHRINE HYDROCHLORIDE 50 MCG: 10 INJECTION INTRAVENOUS at 10:18

## 2018-01-01 RX ADMIN — Medication 0.3 MG: at 12:00

## 2018-01-01 RX ADMIN — NYSTATIN: 100000 CREAM TOPICAL at 08:26

## 2018-01-01 RX ADMIN — Medication 200 MCG: at 14:18

## 2018-01-01 RX ADMIN — ROPINIROLE HYDROCHLORIDE 2 MG: 1 TABLET, FILM COATED ORAL at 22:01

## 2018-01-01 RX ADMIN — Medication 100 MCG: at 14:38

## 2018-01-01 RX ADMIN — ENOXAPARIN SODIUM 40 MG: 40 INJECTION SUBCUTANEOUS at 20:46

## 2018-01-01 RX ADMIN — ACETAMINOPHEN 975 MG: 325 TABLET, FILM COATED ORAL at 06:15

## 2018-01-01 RX ADMIN — OXYBUTYNIN CHLORIDE 10 MG: 10 TABLET, EXTENDED RELEASE ORAL at 09:31

## 2018-01-01 RX ADMIN — ROCURONIUM BROMIDE 10 MG: 10 INJECTION INTRAVENOUS at 10:15

## 2018-01-01 RX ADMIN — HYDROMORPHONE HYDROCHLORIDE 2 MG: 2 TABLET ORAL at 08:34

## 2018-01-01 RX ADMIN — FENTANYL CITRATE 100 MCG: 50 INJECTION, SOLUTION INTRAMUSCULAR; INTRAVENOUS at 14:10

## 2018-01-01 RX ADMIN — SENNOSIDES AND DOCUSATE SODIUM 2 TABLET: 8.6; 5 TABLET ORAL at 20:46

## 2018-01-01 RX ADMIN — NEOSTIGMINE METHYLSULFATE 5 MG: 1 INJECTION INTRAMUSCULAR; INTRAVENOUS; SUBCUTANEOUS at 11:13

## 2018-01-01 RX ADMIN — Medication 3 G: at 10:00

## 2018-01-01 RX ADMIN — ATORVASTATIN CALCIUM 20 MG: 20 TABLET, FILM COATED ORAL at 22:14

## 2018-01-01 RX ADMIN — SODIUM CHLORIDE 1000 ML: 900 IRRIGANT IRRIGATION at 14:24

## 2018-01-01 RX ADMIN — SENNOSIDES AND DOCUSATE SODIUM 2 TABLET: 8.6; 5 TABLET ORAL at 10:23

## 2018-01-01 RX ADMIN — ACETAMINOPHEN 975 MG: 325 TABLET, FILM COATED ORAL at 06:01

## 2018-01-01 RX ADMIN — ONDANSETRON 4 MG: 2 INJECTION INTRAMUSCULAR; INTRAVENOUS at 10:00

## 2018-01-01 RX ADMIN — ATORVASTATIN CALCIUM 20 MG: 20 TABLET, FILM COATED ORAL at 22:01

## 2018-01-01 RX ADMIN — PROPOFOL 50 MG: 10 INJECTION, EMULSION INTRAVENOUS at 09:50

## 2018-01-01 RX ADMIN — EPHEDRINE SULFATE 10 MG: 50 INJECTION, SOLUTION INTRAMUSCULAR; INTRAVENOUS; SUBCUTANEOUS at 14:26

## 2018-01-01 RX ADMIN — ROPINIROLE HYDROCHLORIDE 2 MG: 1 TABLET, FILM COATED ORAL at 22:18

## 2018-01-01 RX ADMIN — ALBUMIN (HUMAN) 500 ML: 12.5 SOLUTION INTRAVENOUS at 11:52

## 2018-01-01 RX ADMIN — Medication: at 10:54

## 2018-01-01 RX ADMIN — OMEPRAZOLE 20 MG: 20 CAPSULE, DELAYED RELEASE ORAL at 06:01

## 2018-01-01 RX ADMIN — Medication 0.5 MG: at 20:31

## 2018-01-01 RX ADMIN — SODIUM CHLORIDE, POTASSIUM CHLORIDE, SODIUM LACTATE AND CALCIUM CHLORIDE: 600; 310; 30; 20 INJECTION, SOLUTION INTRAVENOUS at 15:30

## 2018-01-01 RX ADMIN — HYDROMORPHONE HYDROCHLORIDE 2 MG: 2 TABLET ORAL at 10:03

## 2018-01-01 RX ADMIN — ACETAMINOPHEN 975 MG: 325 TABLET, FILM COATED ORAL at 06:06

## 2018-01-01 RX ADMIN — OXYBUTYNIN CHLORIDE 10 MG: 10 TABLET, EXTENDED RELEASE ORAL at 10:23

## 2018-01-01 RX ADMIN — SODIUM CHLORIDE 1000 ML: 900 IRRIGANT IRRIGATION at 09:52

## 2018-01-01 RX ADMIN — Medication 0.5 MG: at 12:21

## 2018-01-01 RX ADMIN — SENNOSIDES AND DOCUSATE SODIUM 1 TABLET: 8.6; 5 TABLET ORAL at 22:15

## 2018-01-01 RX ADMIN — FLUOXETINE 40 MG: 20 CAPSULE ORAL at 10:23

## 2018-01-01 RX ADMIN — IBUPROFEN 200 MG: 200 TABLET, FILM COATED ORAL at 04:14

## 2018-01-01 RX ADMIN — LIDOCAINE HYDROCHLORIDE 60 MG: 20 INJECTION, SOLUTION INFILTRATION; PERINEURAL at 14:10

## 2018-01-01 RX ADMIN — FENTANYL CITRATE 100 MCG: 50 INJECTION, SOLUTION INTRAMUSCULAR; INTRAVENOUS at 09:35

## 2018-01-01 RX ADMIN — IBUPROFEN 200 MG: 200 TABLET, FILM COATED ORAL at 22:15

## 2018-01-01 RX ADMIN — NYSTATIN: 100000 CREAM TOPICAL at 22:34

## 2018-01-01 RX ADMIN — IPRATROPIUM BROMIDE AND ALBUTEROL SULFATE 3 ML: .5; 3 SOLUTION RESPIRATORY (INHALATION) at 09:20

## 2018-01-01 RX ADMIN — BUPROPION HYDROCHLORIDE 300 MG: 150 TABLET, FILM COATED, EXTENDED RELEASE ORAL at 08:25

## 2018-01-01 RX ADMIN — OMEPRAZOLE 20 MG: 20 CAPSULE, DELAYED RELEASE ORAL at 08:25

## 2018-01-01 RX ADMIN — GLYCOPYRROLATE 0.8 MG: 0.2 INJECTION, SOLUTION INTRAMUSCULAR; INTRAVENOUS at 11:13

## 2018-01-01 RX ADMIN — MIDAZOLAM 0.5 MG: 1 INJECTION INTRAMUSCULAR; INTRAVENOUS at 09:30

## 2018-01-01 RX ADMIN — SODIUM CHLORIDE: 9 INJECTION, SOLUTION INTRAVENOUS at 20:13

## 2018-01-01 RX ADMIN — ETOMIDATE 14 MG: 2 INJECTION INTRAVENOUS at 14:10

## 2018-01-01 RX ADMIN — SODIUM CHLORIDE 500 ML: 9 INJECTION, SOLUTION INTRAVENOUS at 03:13

## 2018-01-01 RX ADMIN — ACETAMINOPHEN 975 MG: 325 TABLET, FILM COATED ORAL at 22:14

## 2018-01-01 RX ADMIN — ALBUTEROL SULFATE 2 PUFF: 90 INHALANT RESPIRATORY (INHALATION) at 17:46

## 2018-01-01 RX ADMIN — Medication 100 MCG: at 14:10

## 2018-01-01 RX ADMIN — Medication 1 DEVICE: at 10:10

## 2018-01-01 RX ADMIN — SODIUM CHLORIDE, POTASSIUM CHLORIDE, SODIUM LACTATE AND CALCIUM CHLORIDE: 600; 310; 30; 20 INJECTION, SOLUTION INTRAVENOUS at 09:57

## 2018-01-01 RX ADMIN — SODIUM CHLORIDE: 9 INJECTION, SOLUTION INTRAVENOUS at 22:18

## 2018-01-01 RX ADMIN — DEXAMETHASONE SODIUM PHOSPHATE 4 MG: 4 INJECTION, SOLUTION INTRA-ARTICULAR; INTRALESIONAL; INTRAMUSCULAR; INTRAVENOUS; SOFT TISSUE at 10:00

## 2018-01-01 RX ADMIN — LIDOCAINE HYDROCHLORIDE 60 MG: 20 INJECTION, SOLUTION INFILTRATION; PERINEURAL at 09:35

## 2018-01-01 RX ADMIN — Medication 100 MCG: at 14:37

## 2018-01-01 RX ADMIN — EPHEDRINE SULFATE 5 MG: 50 INJECTION, SOLUTION INTRAMUSCULAR; INTRAVENOUS; SUBCUTANEOUS at 14:18

## 2018-01-01 RX ADMIN — ROPINIROLE HYDROCHLORIDE 1 MG: 1 TABLET, FILM COATED ORAL at 09:32

## 2018-01-01 RX ADMIN — OXYBUTYNIN CHLORIDE 10 MG: 10 TABLET, EXTENDED RELEASE ORAL at 08:26

## 2018-01-01 RX ADMIN — BACITRACIN: 500 OINTMENT TOPICAL at 09:36

## 2018-01-01 RX ADMIN — LIDOCAINE HYDROCHLORIDE 1 ML: 10 INJECTION, SOLUTION EPIDURAL; INFILTRATION; INTRACAUDAL; PERINEURAL at 09:21

## 2018-01-01 RX ADMIN — Medication 2 LOZENGE: at 06:14

## 2018-01-01 RX ADMIN — BACITRACIN: 500 OINTMENT TOPICAL at 08:23

## 2018-01-01 RX ADMIN — ACETAMINOPHEN 975 MG: 325 TABLET, FILM COATED ORAL at 22:01

## 2018-01-01 RX ADMIN — FLUOXETINE 40 MG: 20 CAPSULE ORAL at 09:31

## 2018-01-01 RX ADMIN — SENNOSIDES AND DOCUSATE SODIUM 1 TABLET: 8.6; 5 TABLET ORAL at 22:17

## 2018-01-01 RX ADMIN — ALBUTEROL SULFATE 2 PUFF: 90 INHALANT RESPIRATORY (INHALATION) at 22:31

## 2018-01-01 RX ADMIN — SODIUM CHLORIDE, POTASSIUM CHLORIDE, SODIUM LACTATE AND CALCIUM CHLORIDE: 600; 310; 30; 20 INJECTION, SOLUTION INTRAVENOUS at 13:09

## 2018-01-01 RX ADMIN — BUPIVACAINE HYDROCHLORIDE 30 ML: 5 INJECTION, SOLUTION PERINEURAL at 10:57

## 2018-01-01 RX ADMIN — BACITRACIN: 500 OINTMENT TOPICAL at 10:24

## 2018-01-01 RX ADMIN — FENTANYL CITRATE 25 MCG: 50 INJECTION, SOLUTION INTRAMUSCULAR; INTRAVENOUS at 11:16

## 2018-01-01 ASSESSMENT — LIFESTYLE VARIABLES
TOBACCO_USE: 1
TOBACCO_USE: 1

## 2018-01-01 ASSESSMENT — PAIN DESCRIPTION - DESCRIPTORS: DESCRIPTORS: ACHING

## 2018-01-03 NOTE — H&P (VIEW-ONLY)
HISTORY OF PRESENT ILLNESS:  Pat Haley, a 72-year-old woman with recurrent lung cancer (awaiting right lung surgery), hypertension, bradycardia, obesity, dyslipidemia, depression and obstructive sleep apnea was evaluated prior to lung surgery at the request of   for bradycardia.      I saw Ms. Haley initially in 10/2016 for evaluation of episodic atypical chest discomfort and an abnormal stress test.  Diagnostic coronary angiography showed angiographically normal coronaries with a right dominant system.      The patient has a history of left lung squamous cell carcinoma and previously underwent resection.  Recent followup testing has demonstrated right lung carcinoma for which the patient is scheduled to undergo right lung surgery with Dr. Villavicencio.      During the patient's recent visit with Dr. Torrez, she noted the patient's heart rate was slow.  Review of my records indicates the patient's heart rates have run between 54 and 62 during all previous visits.  She remains on metoprolol XL at 25 mg daily.  The patient denies syncope.      PAST MEDICAL HISTORY:   1.  Depression.   2.  Hypertension.   3.  Obesity.   4.  Squamous cell carcinoma of the lung.   a.  History of left lower lung resection.   b.  Recurrent tumor on recent scanning.  Awaiting right lung surgery.   5.  Dyslipidemia, on atorvastatin.   6.  Depression.   7.  Hypertension.      PHYSICAL EXAMINATION:   GENERAL:  Exam today demonstrates a very pleasant, cooperative 72-year-old woman who is overweight.   VITAL SIGNS:  Her blood pressure is 120/80, heart rate 54 and regular.  Her height is 1.6 meters.  Her weight is 126.4 kg.  Her BMI is 46.5.   LUNGS:  Clear to percussion and auscultation.   CARDIOVASCULAR:  Shows a normal S1 with a normal S2, no S3.  There is no murmur, rub or click.   EXTREMITIES:  Her pulses are full and symmetrical.      LABORATORY STUDIES:  Her last ECG in 11/2016 showed a sinus bradycardia.      ASSESSMENT:   Ms. Bryan has longstanding sinus bradycardia, potentially worsened by beta blocker therapy.  At present, there is no indication for pacemaker implantation, but I do not see a clear indication for metoprolol.  I would simply stop this medication, which can exacerbate underlying bradycardia. I have explained to the patient and her family that it is possible at some time in the future that she may develop bradycardia that becomes significant enough to warrant pacemaker implantation, but that there is currently no indication for PPM.     In view of her normal coronary angiogram, no further cardiac testing is indicated prior to upcoming lung surgery.        RECOMMENDATIONS:   1.  Discontinue Toprol-XL.   2.  No further cardiac testing needed at this time prior to upcoming lung surgery.   3.  Can follow her rhythm with primary care physician.  We can reassess if she develops syncope or symptomatic bradycardia.      We have appreciated the opportunity to care for your patient, Yamila Bryan.      cc:      Thiago Villavicencio MD    [facility]   [address] [place] [address], [address]   [place] [address]       Nita Torrez MD    [facility] [address]   [place] [address]       Marshal Mandujano MD   [facility]         ADALID ISRAEL MD             D: 2017 15:04   T: 2017 15:33   MT: TARA      Name:     YAMILA BRYAN   MRN:      [id number]        Account:      [id number]   :      1945           Service Date: 2017      Document: [id number]

## 2018-01-05 PROBLEM — C34.31 MALIGNANT NEOPLASM OF LOWER LOBE OF RIGHT LUNG (H): Status: ACTIVE | Noted: 2018-01-01

## 2018-01-05 NOTE — ANESTHESIA POSTPROCEDURE EVALUATION
Patient: Pat Haley    Procedure(s):  RIGHT THORACOTOMY, WEDGE RESECTION RIGHT LOWER LOBE LUNG NODULE, RIGHT LOWER LOBECTOMY, MEDIASTINAL LYMPH NODE DISSECTION - Wound Class: II-Clean Contaminated    Diagnosis:RIGHT LUNG NODULE, HISTORY OF LUNG CANCER   Diagnosis Additional Information: No value filed.    Anesthesia Type:  General, ETT    Note:  Anesthesia Post Evaluation    Patient location during evaluation: PACU  Patient participation: Able to fully participate in evaluation  Level of consciousness: awake and alert  Pain management: adequate  Airway patency: patent  Cardiovascular status: hypotensive  Respiratory status: nasal cannula.  Hydration status: hypovolemic  PONV: none     Anesthetic complications: None    Comments: Bleeding, going back to the OR urgently        Last vitals:  Vitals:    01/05/18 1356 01/05/18 1358 01/05/18 1400   BP: 93/71 (!) 68/36 96/47   Pulse:      Resp: 18 20 20   Temp:      SpO2: 99% 98% 98%         Electronically Signed By: Danial Lanier MD  January 5, 2018  3:27 PM

## 2018-01-05 NOTE — ANESTHESIA CARE TRANSFER NOTE
Patient: Pat Haley    Procedure(s):  RIGHT THORACOTOMY, WEDGE RESECTION RIGHT LOWER LOBE LUNG NODULE, RIGHT LOWER LOBECTOMY, MEDIASTINAL LYMPH NODE DISSECTION - Wound Class: II-Clean Contaminated    Diagnosis: RIGHT LUNG NODULE, HISTORY OF LUNG CANCER   Diagnosis Additional Information: No value filed.    Anesthesia Type:   General, ETT     Note:  Airway :Face Mask  Patient transferred to:PACU  Comments: Anesthesia Care Note    Patient: Pat Haley    Transferred to: PACU    Patient vital signs: Stable    Airway: FM    Monitors placed. VSS. PIV patent. No change in dentition. Report given to ZOILA Wells CRNA   1/5/2018  Handoff Report: Identifed the Patient, Identified the Reponsible Provider, Reviewed the pertinent medical history, Discussed the surgical course, Reviewed Intra-OP anesthesia mangement and issues during anesthesia, Set expectations for post-procedure period and Allowed opportunity for questions and acknowledgement of understanding      Vitals: (Last set prior to Anesthesia Care Transfer)    CRNA VITALS  1/5/2018 1056 - 1/5/2018 1132      1/5/2018             Pulse: 89    SpO2: 100 %    Resp Rate (observed): 22    Resp Rate (set): 10                Electronically Signed By: TOM Sheppard CRNA  January 5, 2018  11:32 AM

## 2018-01-05 NOTE — BRIEF OP NOTE
[facility] Brief Operative Note    Pre-operative diagnosis: POST OP BLEED   Post-operative diagnosis Post-op bleed from branch of bronchial artery   Procedure: Procedure(s):  REDO THORACOTOMY, EVACUATION OF HEMOTHORAX, HEMOSTASIS. - Wound Class: II-Clean Contaminated   Surgeon(s): Surgeon(s) and Role:     * Thiago Villavicencio MD - Primary     * Jennifer Rodriguez PA-C - Assisting   Estimated blood loss: 50 cc   Specimens: * No specimens in log *   Findings: 500 cc of clotted blood removed. Source of hemothorax identified as branch of broncial artery. After application of clips, hemostasis was excellent. Thoroughly irrigated and explored again to verify hemostasis.

## 2018-01-05 NOTE — ANESTHESIA CARE TRANSFER NOTE
Patient: Pat Haley    Procedure(s):  REDO THORACOTOMY, EVACUATION OF HEMOTHORAX, HEMOSTASIS. - Wound Class: II-Clean Contaminated    Diagnosis: POST OP BLEED  Diagnosis Additional Information: No value filed.    Anesthesia Type:   General, ETT     Note:  Airway :Face Mask  Patient transferred to:PACU  Comments: Hypotensive, on phenylephrine drip and blood being given. Exchanging well, good ventilation and oxygenation.      Vitals: (Last set prior to Anesthesia Care Transfer)    CRNA VITALS  1/5/2018 1444 - 1/5/2018 1529      1/5/2018             NIBP: 105/65    Pulse: 86    NIBP Mean: 90    SpO2: 100 %    Resp Rate (observed): (!)  1    Resp Rate (set): 10                Electronically Signed By: TOM Rojas CRNA  January 5, 2018  3:29 PM

## 2018-01-05 NOTE — OR NURSING
"1155 CxR done.  Dr. Villavicencio at bedside, read CxR and states \"looks good.\"     1200 CT output 115 ml's over 30 minutes and dark red.  Jennifer LARIOS notified.  States will come to assess patient.  1226  CT output 275 ml's over 25 minutes and dark red with clots.  Call out to Keo LARIOS to come assess the patient stat.  1230  Jennifer LARIOS at bedside.  Assessed patient and updated Dr. Villavicencio regarding CT ouput with clots.  1240  Dr. Villavicencio communicated with Jennifer LARIOS and states to continue to monitor at this time.    1242  Per Jennifer Call if in the next 30 minutes if CT output greater than 100 ml's.  1250  Bipap removed.  On oxygen via NC at 4L.  1305   called for hypotension.  Order to give 250 ml's of albumin.  1315  CT output 175 ml's and dark red with few clots.  Jennifer Rodriguez notified.  She states she will come to assess patient.  1330  Jennifer at bedside.  CT output another 175 ml's and continues to be dark red.  Jennifer Rodriguez spoke with Dr. Villavicencio and he states he will be taking her back to the OR.    1335  Jennifer Rodriguez updated her daughter.   1340  Dr. Lanier at bedside.  Giving phenylephrine bumps for hypotension.  1350  Family at bedside.  1355  Dr. Villavicencio at bedside and updated patient and family.  1403  Patient brought back to the OR.    "

## 2018-01-05 NOTE — PROGRESS NOTES
Admission medication history interview status for the 1/5/2018  admission is complete. See EPIC admission navigator for prior to admission medications     Medication history source reliability:Good    Medication history interview source(s):Patient    Medication history resources (including written lists, pill bottles, clinic record):Patient brought a list from home    Primary pharmacy.[facility]    Additional medication history information not noted on PTA med list :None    Time spent in this activity: 40 minutes    Prior to Admission medications    Medication Sig Last Dose Taking? Auth Provider   ASPIRIN EC PO Take 325 mg by mouth At Bedtime 1/4/2018 at PM Yes Reported, Patient   Atorvastatin Calcium (LIPITOR PO) Take 20 mg by mouth At Bedtime 1/4/2018 at HS Yes Reported, Patient   losartan-hydrochlorothiazide (HYZAAR) 50-12.5 MG per tablet Take 1 tablet by mouth At Bedtime 1/3/2018 at PM Yes Reported, Patient   ROPINIRole HCl (REQUIP PO) Take 2 mg by mouth At Bedtime (2 x 1 mg = 2 mg dose) 1/4/2018 at HS Yes Reported, Patient   TRAZODONE HCL PO Take  mg by mouth At Bedtime 1/4/2018 at HS Yes Reported, Patient   mupirocin (BACTROBAN) 2 % ointment Spray 0.5 g into right nostril 3 times daily as needed More than a Month at PRN Yes Reported, Patient   nystatin (MYCOSTATIN) cream Apply topically 2 times daily 1/5/2018 at 0315 Yes Reported, Patient   Glycopyrrolate-Formoterol (BEVESPI AEROSPHERE) 9-4.8 MCG/ACT oral inhaler Inhale 2 puffs into the lungs 2 times daily 1/4/2018 at 1800 Yes Reported, Patient   Ascorbic Acid (VITAMIN C PO) Take 1 tablet by mouth daily 1/4/2018 at AM Yes Reported, Patient   buPROPion (WELLBUTRIN XL) 300 MG 24 hr tablet Take 300 mg by mouth every morning 1/5/2018 at 0315 Yes Reported, Patient   oxybutynin (DITROPAN-XL) 10 MG 24 hr tablet Take 10 mg by mouth daily 1/5/2018 at 0315 Yes Reported, Patient   nitroglycerin (NITROSTAT) 0.3 MG SL tablet Place 1 tablet (0.3 mg) under the tongue every 5  minutes as needed for chest pain if you still have symptoms after 3 doses (15 min) call 911 Never Needed at PRN Yes Eder Cage MD   Acetaminophen (TYLENOL PO) Take 1,000 mg by mouth 2 times daily as needed for mild pain or fever  Past Week at PRN Yes Reported, Patient   FLUOXETINE HCL PO Take 40 mg by mouth daily  1/5/2018 at 0315 Yes Reported, Patient   Lactobacillus (DIGESTIVE HEALTH PROBIOTIC) CAPS Take 1 tablet by mouth daily 1/4/2018 at AM Yes Reported, Patient   Docusate Calcium (STOOL SOFTENER PO) Take 1 tablet by mouth 2 times daily 1-2 tablets daily  1/4/2018 at HS Yes Reported, Patient   albuterol (PROAIR HFA, PROVENTIL HFA, VENTOLIN HFA) 108 (90 BASE) MCG/ACT inhaler Inhale 2 puffs into the lungs every 6 hours as needed for shortness of breath / dyspnea or wheezing 1/4/2018 at PRN Yes Sunil Louise MD   omeprazole (PRILOSEC) 20 MG capsule Take 20 mg by mouth every morning (before breakfast) 1/5/2018 at 0315 Yes Unknown, Entered By History   rOPINIRole (REQUIP) 1 MG tablet Take 1 mg by mouth every morning  1/5/2018 at 0315 Yes Unknown, Entered By History   Vitamin D, Cholecalciferol, 1000 UNITS TABS Take 2,000 Units by mouth daily  1/4/2018 at AM Yes Unknown, Entered By History

## 2018-01-05 NOTE — OR NURSING
1516  Arrived to PACU with Phenylephrine gtt.  Per MDA wean off when BP stable.    1620  Phenylephrine gtt weaned off.  1646  Dr. Street notified of hypotension.  States will come to bedside to assess and discuss.  1650  Dr. Street at bedside.  BP improved without intervention at this time.   1726  Hgb result called to Dr. Street.  BP stable.  She states ok to transfer to station 33.  1735  Report called to Station [facility], Melly KUMARI.  Pt to room via cart with NA in escort.  All belongings sent with pt.  Family lounge notified of transfer.

## 2018-01-05 NOTE — ANESTHESIA PREPROCEDURE EVALUATION
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    Allergies   Allergen Reactions     Tetanus Immune Globulin      \" almost killed me\"  High fever and in bed for 2 weeks -- told to never get another one     Past Medical History:   Diagnosis Date     Chest tightness or pressure 10/19/2016     Depression     season affective disorder also     Gastro-oesophageal reflux disease      Hypertension      Incontinence of urine      Lung cancer (H)      Restless legs      Sleep apnea      UTI (lower urinary tract infection) 11/2014    sepsis with ecoli     Past Surgical History:   Procedure Laterality Date     ANKLE SURGERY Left      GENITOURINARY SURGERY      urethra enlarged x3     GYN SURGERY      D and C     LOBECTOMY LUNG Left 2/10/2015    Procedure: LOBECTOMY LUNG;  Surgeon: Thiago Villavicencio MD;  Location: [facility] OR     OPEN REDUCTION INTERNAL FIXATION ANKLE Left 11/7/2014    Procedure: OPEN REDUCTION INTERNAL FIXATION ANKLE;  Surgeon: Bam Starkey MD;  Location:  OR     Prior to Admission medications    Medication Sig Start Date End Date Taking? Authorizing Provider   FERROUS GLUCONATE PO Take 324 mg by mouth    Reported, Patient   albuterol (PROAIR HFA, PROVENTIL HFA, VENTOLIN HFA) 108 (90 BASE) MCG/ACT inhaler Inhale 2 puffs into the lungs every 6 hours as needed for shortness of breath / dyspnea or wheezing 11/26/14   Sunil Louise MD   oxyCODONE-acetaminophen (PERCOCET) 5-325 MG per tablet Take 1 tablet by mouth every 6 hours as needed for moderate to severe pain or moderate pain 11/25/14   Sunil Louise MD   tolterodine (DETROL LA) 2 MG 24 hr capsule Take 2 mg by mouth daily    Dummy, Bfp User   omeprazole (PRILOSEC) 20 MG capsule Take 20 mg by mouth every morning (before breakfast)    Dummy, Bfp User   Acetaminophen (TYLENOL PO) Take 650 mg by mouth every 4 hours as needed for mild pain or fever    Dummy, Bfp User   celecoxib " (CELEBREX) 200 MG capsule Take 1 capsule (200 mg) by mouth 2 times daily as needed for moderate pain 11/10/14   Kane Escobar MD   melatonin 1 MG TABS Take 1 tablet (1 mg) by mouth nightly as needed for sleep 11/10/14   Kane Escobar MD   venlafaxine (EFFEXOR-ER) 150 MG TB24 Take 300 mg by mouth daily    Dummy, Bfp User   traZODone (DESYREL) 150 MG tablet Take  mg by mouth nightly as needed for sleep    Dummy, Bfp User   losartan-hydrochlorothiazide (HYZAAR) 100-25 MG per tablet Take 1 tablet by mouth daily    Dummy, Bfp User   rOPINIRole (REQUIP) 1 MG tablet Take 2 mg by mouth At Bedtime    Dummy, Bfp User   aspirin 325 MG tablet Take 325 mg by mouth daily as needed for moderate pain     Dummy, Bfp User   Vitamin D, Cholecalciferol, 1000 UNITS TABS Take 4,000 Units by mouth daily    Dummy, Bfp User     No current Epic-ordered facility-administered medications on file.      Current Outpatient Prescriptions Ordered in Epic   Medication     [DISCONTINUED] FLUoxetine HCl (PROZAC PO)     Wt Readings from Last 1 Encounters:   12/26/17 126.4 kg (278 lb 9.6 oz)     Temp Readings from Last 1 Encounters:   11/09/16 36.3  C (97.4  F) (Temporal)     BP Readings from Last 6 Encounters:   12/26/17 128/80   11/16/16 128/63   11/09/16 100/49   10/28/16 122/64   10/20/16 130/80   02/16/15 132/59     Pulse Readings from Last 4 Encounters:   12/26/17 54   11/16/16 56   11/09/16 (!) 45   10/28/16 60     Resp Readings from Last 1 Encounters:   11/09/16 14     SpO2 Readings from Last 1 Encounters:   11/09/16 97%     Recent Labs   Lab Test  11/25/14   0600  11/22/14   0750  11/21/14   2328  11/21/14   1930  11/09/14   0705   11/07/14   0648   NA   --    --    --   134   --    --   139   POTASSIUM  3.8  3.8   --   3.2*   --    --   3.5   CHLORIDE   --    --    --   97   --    --   105   CO2   --    --    --   29   --    --   33*   ANIONGAP   --    --    --   8   --    --   1*   GLC   --    --    --   114*  96   < >   110*   BUN   --    --    --   14   --    --   12   CR   --    --   0.68  0.68   --    --   0.59   EVIE   --    --    --   8.6   --    --   8.6    < > = values in this interval not displayed.     Recent Labs   Lab Test  01/13/15   0850  11/25/14   0600   WBC  7.6  4.7   HGB  12.6  9.8*   PLT  273  297     Recent Labs   Lab Test  01/13/15   0850   INR  0.93      RECENT LABS:   ECG:   ECHO:   CXR:      Anesthesia Evaluation     .             ROS/MED HX    ENT/Pulmonary:     (+)sleep apnea, tobacco use, Past use doesn't use CPAP , . .    Neurologic:       Cardiovascular:     (+) hypertension----. : . . . :. . Previous cardiac testing date:results:Stress Testdate: results:Impression  1. Myocardial perfusion imaging using single isotope technique  demonstrated following defects     A. Moderate size mild intensity mid to distal anterior wall  reversible defect that may overall indicate moderate anterior  ischemia. The specificity of this finding is somewhat reduced due to  underlying body habitus.   B. Small size mild to moderate intensity basal inferolateral wall  reversible defect that is consistent with mild to moderate basal  inferolateral ischemia  2. Gated images demonstrated no regional wall motion abnormalities.   The left ventricular systolic function is normal with LVEF of 76% with  stress.  3. No prior study for comparison. date: results: date: results:          METS/Exercise Tolerance:     Hematologic:         Musculoskeletal:         GI/Hepatic:     (+) GERD Asymptomatic on medication,       Renal/Genitourinary:         Endo:         Psychiatric:     (+) psychiatric history depression      Infectious Disease:         Malignancy:   (+) Malignancy History of Breast and Lung          Other:                     Physical Exam  Normal systems: cardiovascular, pulmonary and dental    Airway   Mallampati: I  TM distance: >3 FB  Neck ROM: full    Dental     Cardiovascular       Pulmonary                     Anesthesia  Plan      History & Physical Review  History and physical reviewed and following examination; no interval change.    ASA Status:  2 .        Plan for General and ETT with Intravenous and Propofol induction. Maintenance will be Balanced.      Additional equipment: Videolaryngoscope, Fiber Optic Broncoscopy and Double Lumen ETT      Postoperative Care      Consents  Anesthetic plan, risks, benefits and alternatives discussed with:  Patient or representative..                            .    Anesthesia Plan      History & Physical Review  History and physical reviewed and following examination; no interval change.    ASA Status:  2 .        Plan for General and ETT with Intravenous and Propofol induction. Maintenance will be Balanced.      Additional equipment: Videolaryngoscope, Fiber Optic Broncoscopy and Double Lumen ETT      Postoperative Care      Consents  Anesthetic plan, risks, benefits and alternatives discussed with:  Patient or representative..

## 2018-01-05 NOTE — ANESTHESIA PREPROCEDURE EVALUATION
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    Allergies   Allergen Reactions     Tetanus Immune Globulin      \" almost killed me\"  High fever and in bed for 2 weeks -- told to never get another one     Past Medical History:   Diagnosis Date     Chest tightness or pressure 10/19/2016     Depression     season affective disorder also     Gastro-oesophageal reflux disease      Hypertension      Incontinence of urine      Lung cancer (H)      Restless legs      Sleep apnea      UTI (lower urinary tract infection) 11/2014    sepsis with ecoli     Past Surgical History:   Procedure Laterality Date     ANKLE SURGERY Left      GENITOURINARY SURGERY      urethra enlarged x3     GYN SURGERY      D and C     LOBECTOMY LUNG Left 2/10/2015    Procedure: LOBECTOMY LUNG;  Surgeon: Thiago Villavicencio MD;  Location: [facility] OR     OPEN REDUCTION INTERNAL FIXATION ANKLE Left 11/7/2014    Procedure: OPEN REDUCTION INTERNAL FIXATION ANKLE;  Surgeon: Bam Starkey MD;  Location:  OR     Prior to Admission medications    Medication Sig Start Date End Date Taking? Authorizing Provider   FERROUS GLUCONATE PO Take 324 mg by mouth    Reported, Patient   albuterol (PROAIR HFA, PROVENTIL HFA, VENTOLIN HFA) 108 (90 BASE) MCG/ACT inhaler Inhale 2 puffs into the lungs every 6 hours as needed for shortness of breath / dyspnea or wheezing 11/26/14   Sunil Louise MD   oxyCODONE-acetaminophen (PERCOCET) 5-325 MG per tablet Take 1 tablet by mouth every 6 hours as needed for moderate to severe pain or moderate pain 11/25/14   Sunil Louise MD   tolterodine (DETROL LA) 2 MG 24 hr capsule Take 2 mg by mouth daily    Dummy, Bfp User   omeprazole (PRILOSEC) 20 MG capsule Take 20 mg by mouth every morning (before breakfast)    Dummy, Bfp User   Acetaminophen (TYLENOL PO) Take 650 mg by mouth every 4 hours as needed for mild pain or fever    Dummy, Bfp User   celecoxib " "(CELEBREX) 200 MG capsule Take 1 capsule (200 mg) by mouth 2 times daily as needed for moderate pain 11/10/14   Kane Escobar MD   melatonin 1 MG TABS Take 1 tablet (1 mg) by mouth nightly as needed for sleep 11/10/14   Kane Escobar MD   venlafaxine (EFFEXOR-ER) 150 MG TB24 Take 300 mg by mouth daily    Dummy, Bfp User   traZODone (DESYREL) 150 MG tablet Take  mg by mouth nightly as needed for sleep    Dummy, Bfp User   losartan-hydrochlorothiazide (HYZAAR) 100-25 MG per tablet Take 1 tablet by mouth daily    Dummy, Bfp User   rOPINIRole (REQUIP) 1 MG tablet Take 2 mg by mouth At Bedtime    Dummy, Bfp User   aspirin 325 MG tablet Take 325 mg by mouth daily as needed for moderate pain     Dummy, Bfp User   Vitamin D, Cholecalciferol, 1000 UNITS TABS Take 4,000 Units by mouth daily    Dummy, Bfp User     Current Facility-Administered Medications Ordered in Epic   Medication Dose Route Frequency Last Rate Last Dose     lactated ringers infusion   Intravenous Continuous 100 mL/hr at 01/05/18 1309       fentaNYL (PF) (SUBLIMAZE) injection 25-50 mcg  25-50 mcg Intravenous Q2 Min PRN         HYDROmorphone (PF) (DILAUDID) injection 0.3-0.5 mg  0.3-0.5 mg Intravenous Q5 Min PRN   0.5 mg at 01/05/18 1221     ondansetron (ZOFRAN-ODT) ODT tab 4 mg  4 mg Oral Q30 Min PRN        Or     ondansetron (ZOFRAN) injection 4 mg  4 mg Intravenous Q30 Min PRN         prochlorperazine (COMPAZINE) injection 5 mg  5 mg Intravenous Q6H PRN         sodium chloride 0.9% (bottle) irrigation    PRN   1,000 mL at 01/05/18 1050     ON-Q  4 mL/hr (-M holds 270-335 mL) 2.5\" dual cath disposable pump disposable pump    PRN   1 Device at 01/05/18 1010     BUPivacaine 0.5% (MARCAINE) 550 mL in ON-Q  4 mL/hr (P400 x 4D) dual cath disposable pump   Irrigation Continuous 4 mL/hr at 01/05/18 1054       bupivacaine (MARCAINE) injection 0.5%    PRN   30 mL at 01/05/18 1057     Current Outpatient Prescriptions Ordered in Epic "   Medication     [DISCONTINUED] FLUoxetine HCl (PROZAC PO)     Wt Readings from Last 1 Encounters:   01/05/18 128.7 kg (283 lb 11.2 oz)     Temp Readings from Last 1 Encounters:   01/05/18 35.6  C (96  F) (Temporal)     BP Readings from Last 6 Encounters:   01/05/18 106/62   12/26/17 128/80   11/16/16 128/63   11/09/16 100/49   10/28/16 122/64   10/20/16 130/80     Pulse Readings from Last 4 Encounters:   01/05/18 72   12/26/17 54   11/16/16 56   11/09/16 (!) 45     Resp Readings from Last 1 Encounters:   01/05/18 16     SpO2 Readings from Last 1 Encounters:   01/05/18 98%     Recent Labs   Lab Test  11/25/14   0600  11/22/14   0750  11/21/14   2328  11/21/14   1930  11/09/14   0705   11/07/14   0648   NA   --    --    --   134   --    --   139   POTASSIUM  3.8  3.8   --   3.2*   --    --   3.5   CHLORIDE   --    --    --   97   --    --   105   CO2   --    --    --   29   --    --   33*   ANIONGAP   --    --    --   8   --    --   1*   GLC   --    --    --   114*  96   < >  110*   BUN   --    --    --   14   --    --   12   CR   --    --   0.68  0.68   --    --   0.59   EVIE   --    --    --   8.6   --    --   8.6    < > = values in this interval not displayed.     Recent Labs   Lab Test  01/13/15   0850  11/25/14   0600   WBC  7.6  4.7   HGB  12.6  9.8*   PLT  273  297     Recent Labs   Lab Test  01/13/15   0850   INR  0.93      RECENT LABS:   ECG:   ECHO:   CXR:      Anesthesia Evaluation     .             ROS/MED HX    ENT/Pulmonary:     (+)sleep apnea, tobacco use, Past use doesn't use CPAP , . .    Neurologic:       Cardiovascular:     (+) hypertension----. : . . . :. . Previous cardiac testing date:results:Stress Testdate: results:Impression  1. Myocardial perfusion imaging using single isotope technique  demonstrated following defects     A. Moderate size mild intensity mid to distal anterior wall  reversible defect that may overall indicate moderate anterior  ischemia. The specificity of this finding is  somewhat reduced due to  underlying body habitus.   B. Small size mild to moderate intensity basal inferolateral wall  reversible defect that is consistent with mild to moderate basal  inferolateral ischemia  2. Gated images demonstrated no regional wall motion abnormalities.   The left ventricular systolic function is normal with LVEF of 76% with  stress.  3. No prior study for comparison. date: results: date: results:          METS/Exercise Tolerance:     Hematologic: Comments: Bleeding into chest s/p R VATS        Musculoskeletal:         GI/Hepatic:     (+) GERD Asymptomatic on medication,       Renal/Genitourinary:         Endo:         Psychiatric:     (+) psychiatric history depression      Infectious Disease:         Malignancy:   (+) Malignancy History of Breast and Lung          Other:                     Physical Exam  Normal systems: cardiovascular, pulmonary and dental    Airway   Mallampati: I  TM distance: >3 FB  Neck ROM: full    Dental     Cardiovascular       Pulmonary                     Anesthesia Plan      History & Physical Review  History and physical reviewed and following examination; no interval change.    ASA Status:  2 emergent.        Plan for General and ETT with Intravenous and Propofol induction. Maintenance will be Balanced.      Additional equipment: Videolaryngoscope, Fiber Optic Broncoscopy and Double Lumen ETT      Postoperative Care      Consents  Anesthetic plan, risks, benefits and alternatives discussed with:  Patient or representative..                            .    Anesthesia Plan      History & Physical Review  History and physical reviewed and following examination; no interval change.    ASA Status:  2 emergent.        Plan for General and ETT with Intravenous and Propofol induction. Maintenance will be Balanced.      Additional equipment: Videolaryngoscope, Fiber Optic Broncoscopy and Double Lumen ETT      Postoperative Care      Consents  Anesthetic plan, risks,  benefits and alternatives discussed with:  Patient or representative..

## 2018-01-05 NOTE — PROGRESS NOTES
THORACIC SURGERY    Seen in PACU, high bloody chest tube output.  approx 600  Ml in the last few hours.  VSS    reviewed with pt and daughter    Needs exploration    Discussed    JUSTIN YU MD [facility] ONCOLOGY THORACIC SURGERY  CELL:  [phone]  OFFICE: [phone]

## 2018-01-05 NOTE — OR NURSING
1350  Daughter Vicki and niece Melissa at bedside before pt goes back to the OR.  Pt's jewlery sent with Vicki.  2 gold hoop earrings,2 white stone earrings, and 1 pinkie purple stone ring.

## 2018-01-05 NOTE — BRIEF OP NOTE
[facility] Brief Operative Note    Pre-operative diagnosis: RIGHT LUNG NODULE, HISTORY OF LUNG CANCER    Post-operative diagnosis right lower lobe lung cancer     Procedure: Procedure(s):  RIGHT THORACOTOMY, WEDGE RESECTION RIGHT LOWER LOBE LUNG NODULE, RIGHT LOWER LOBECTOMY, MEDIASTINAL LYMPH NODE DISSECTION - Wound Class: II-Clean Contaminated   Surgeon(s): Surgeon(s) and Role:     * Thiago Villavicencio MD - Primary     * Jennifer Rodriguez PA-C - Assisting   Estimated blood loss: 150 mL    Specimens:   ID Type Source Tests Collected by Time Destination   A : SUBCARINAL LYMPH NODES Tissue Lymph Node, Subcarinal (7) SURGICAL PATHOLOGY EXAM Thiago Villavicencio MD 1/5/2018 10:08 AM    B : RIGHT LOWER LOBE LUNG NODULE Tissue Lung, Right Lower Lobe SURGICAL PATHOLOGY EXAM Thiago Villavicencio MD 1/5/2018 10:17 AM    C : RIGHT PARATRACHEAL LYMPH NODE Tissue Lymph Node, Paratracheal Lower (4R) SURGICAL PATHOLOGY EXAM Thiago Villavicencio MD 1/5/2018 10:20 AM    D : RIGHT LOWER LOBE - LUNG Tissue Lung, Right Lower Lobe SURGICAL PATHOLOGY EXAM Thiago Villavicencio MD 1/5/2018 10:45 AM       Findings: Frozen section of subcarinal lymph node revealed a benign lymph node- await final pathology

## 2018-01-06 NOTE — CONSULTS
[facility]    Hospitalist Consultation    Date of Admission:  1/5/2018    Assessment & Plan   Pat Haley is a 72 year old female, with a past medical history of: lung cancer, retinal vein occlusion, hypertension, depression, osteoarthritis of knees, degenerative disc disease, GERD, urinary incontinence, restless leg syndrome, osteo-arthritis, obstructive sleep apnea with CPAP use, COPD and hyperlipidemia who was admitted on 1/5/2018 for right thoracotomy, wedge resection of right lower lobe, right lower lobectomy, mediastinal lymph node dissection and subsequent hemothorax on the bronchial artery. Ms. Haley most recently saw cardiology in December of last year for workup of atypical chest pain with an abnormal stress test.  Angiography demonstrated normal coronaries, metoprolol was stopped during this visit secondary to symptomatic bradycardia and syncope. I was asked by Jennifer Rodriguez PA-C to see the patient for co-medical management.     Summary:   Hypertension - patient noted to be hypotensive in the PACU after initial procedure and was taken back for increased chest tube output, currently patient is normotensive and well-managed outpatient on PTA hyzaar  -- hold hydrochlorothiazide component of Hyzaar while receiving IV fluids  -- continue 50 mg losartan in the morning  -- hold ASA until discharge in the setting of acute bleed    Depression - patient managed on PTA wellbutrin and fluoxetine  -- continue wellbutrin and fluoxetine    GERD  -- continue PTA omeprazole    Obstructive sleep apnea - patient uses CPAP at home   -- RT for CPAP while inpatient, sats >92%    COPD -  -- continue PRN albuterol inhaler  -- continue LABA, glycopyrrolate-formoterol    Hyperlipidemia  -- continue PTA atorvastatin    Urinary incontinence   -- continue PTA oxybutynin    Restless leg syndrome  -- continue PTA requip  -- if patient is unable to sleep, resume PTA trazodone    DVT Prophylaxis: Defer to  primary service  Code Status: Full Code    Disposition: Expected discharge per primary service.    This patient was discussed with Fortino Patel DO of the Hospitalist Service, who independently examined the patient. He is in agreement with the above plan.    We, of the Hospitalist Service, thank you for this consult. The patient is medically stable post procedure; the Hospitalist Service will continue to follow. Please do not hesitate to call if additional concerns arise.    TOM Hernandez, CNP    Reason for Consult   Reason for consult: I was asked by Keo Rodriguez PA-C to evaluate this patient for medical co-management.    Primary Care Physician   Marshal Mandujano    Chief Complaint     History is obtained from the patient and electronic health record    History of Present Illness   Pat Haley is a 72 year old female, with a past medical history of: lung cancer, retinal vein occlusion, hypertension, depression, osteoarthritis of knees, degenerative disc disease, GERD, urinary incontinence, restless leg syndrome, osteo-arthritis, obstructive sleep apnea with CPAP use, COPD and hyperlipidemia who was admitted on 1/5/2018 for right thoracotomy, wedge resection of right lower lobe, right lower lobectomy, mediastinal lymph node dissection and subsequent hemothorax on the bronchial artery. Ms. Haley most recently saw cardiology in December of last year for workup of atypical chest pain with an abnormal stress test.  Angiography demonstrated normal coronaries, metoprolol was stopped during this visit secondary to symptomatic bradycardia and syncope.     Past Medical History    I have reviewed this patient's medical history and updated it with pertinent information if needed.   Past Medical History:   Diagnosis Date     Chest tightness or pressure 10/19/2016     Depression     season affective disorder also     Gastro-oesophageal reflux disease      Hypertension      Incontinence of urine       Lung cancer (H)      Restless legs      Sleep apnea      UTI (lower urinary tract infection) 11/2014    sepsis with ecoli       Past Surgical History   I have reviewed this patient's surgical history and updated it with pertinent information if needed.  Past Surgical History:   Procedure Laterality Date     ANKLE SURGERY Left      GENITOURINARY SURGERY      urethra enlarged x3     GYN SURGERY      D and C     LOBECTOMY LUNG Left 2/10/2015    Procedure: LOBECTOMY LUNG;  Surgeon: Thiago Villavicencio MD;  Location: [facility] OR     OPEN REDUCTION INTERNAL FIXATION ANKLE Left 11/7/2014    Procedure: OPEN REDUCTION INTERNAL FIXATION ANKLE;  Surgeon: Bam Starkey MD;  Location:  OR       Prior to Admission Medications   Prior to Admission Medications   Prescriptions Last Dose Informant Patient Reported? Taking?   ASPIRIN EC PO 1/4/2018 at PM Self Yes Yes   Sig: Take 325 mg by mouth At Bedtime   Acetaminophen (TYLENOL PO) Past Week at PRN Self Yes Yes   Sig: Take 1,000 mg by mouth 2 times daily as needed for mild pain or fever    Ascorbic Acid (VITAMIN C PO) 1/4/2018 at AM Self Yes Yes   Sig: Take 1 tablet by mouth daily   Atorvastatin Calcium (LIPITOR PO) 1/4/2018 at HS Self Yes Yes   Sig: Take 20 mg by mouth At Bedtime   Docusate Calcium (STOOL SOFTENER PO) 1/4/2018 at HS Self Yes Yes   Sig: Take 1 tablet by mouth 2 times daily 1-2 tablets daily    FLUOXETINE HCL PO 1/5/2018 at 0315 Self Yes Yes   Sig: Take 40 mg by mouth daily    Glycopyrrolate-Formoterol (BEVESPI AEROSPHERE) 9-4.8 MCG/ACT oral inhaler 1/4/2018 at 1800 Self Yes Yes   Sig: Inhale 2 puffs into the lungs 2 times daily   Lactobacillus (DIGESTIVE HEALTH PROBIOTIC) CAPS 1/4/2018 at AM Self Yes Yes   Sig: Take 1 tablet by mouth daily   ROPINIRole HCl (REQUIP PO) 1/4/2018 at HS Self Yes Yes   Sig: Take 2 mg by mouth At Bedtime (2 x 1 mg = 2 mg dose)   TRAZODONE HCL PO 1/4/2018 at HS Self Yes Yes   Sig: Take  mg by mouth At Bedtime   Vitamin D,  "Cholecalciferol, 1000 UNITS TABS 1/4/2018 at AM Self Yes Yes   Sig: Take 2,000 Units by mouth daily    albuterol (PROAIR HFA, PROVENTIL HFA, VENTOLIN HFA) 108 (90 BASE) MCG/ACT inhaler 1/4/2018 at PRN Self No Yes   Sig: Inhale 2 puffs into the lungs every 6 hours as needed for shortness of breath / dyspnea or wheezing   buPROPion (WELLBUTRIN XL) 300 MG 24 hr tablet 1/5/2018 at 0315 Self Yes Yes   Sig: Take 300 mg by mouth every morning   losartan-hydrochlorothiazide (HYZAAR) 50-12.5 MG per tablet 1/3/2018 at PM Self Yes Yes   Sig: Take 1 tablet by mouth At Bedtime   mupirocin (BACTROBAN) 2 % ointment More than a Month at PRN Self Yes Yes   Sig: Spray 0.5 g into right nostril 3 times daily as needed   nitroglycerin (NITROSTAT) 0.3 MG SL tablet Never Needed at PRN Self No Yes   Sig: Place 1 tablet (0.3 mg) under the tongue every 5 minutes as needed for chest pain if you still have symptoms after 3 doses (15 min) call 911   nystatin (MYCOSTATIN) cream 1/5/2018 at 0315 Self Yes Yes   Sig: Apply topically 2 times daily   omeprazole (PRILOSEC) 20 MG capsule 1/5/2018 at 0315 Self Yes Yes   Sig: Take 20 mg by mouth every morning (before breakfast)   oxybutynin (DITROPAN-XL) 10 MG 24 hr tablet 1/5/2018 at 0315 Self Yes Yes   Sig: Take 10 mg by mouth daily   rOPINIRole (REQUIP) 1 MG tablet 1/5/2018 at [facility] Self Yes Yes   Sig: Take 1 mg by mouth every morning       Facility-Administered Medications: None     Allergies   Allergies   Allergen Reactions     Tetanus Immune Globulin      \" almost killed me\"  High fever and in bed for 2 weeks -- told to never get another one       Social History   I have reviewed this patient's social history and updated it with pertinent information if needed. Pat Haley  reports that she quit smoking about 33 years ago. She has never used smokeless tobacco. She reports that she drinks alcohol. She reports that she does not use illicit drugs.    Family History   I have reviewed this " patient's family history and updated it with pertinent information if needed.   Family History   Problem Relation Age of Onset     Heart Failure Mother      DIABETES Mother      Cirrhosis Father      DIABETES Brother      Other - See Comments Sister      gastric bypass complications     Arthritis Sister      Pacemaker Brother      DIABETES Brother        Review of Systems   The 10 point Review of Systems is negative other than noted in the HPI or here.     Physical Exam   Temp: 97.2  F (36.2  C) Temp src: Temporal BP: 132/77 Pulse: 72 Heart Rate: 87 Resp: 15 SpO2: 98 % O2 Device: Nasal cannula Oxygen Delivery: 2 LPM  Vital Signs with Ranges  Temp:  [96  F (35.6  C)-98.1  F (36.7  C)] 97.2  F (36.2  C)  Pulse:  [72] 72  Heart Rate:  [62-87] 87  Resp:  [12-28] 15  BP: ()/(36-95) 132/77  FiO2 (%):  [30 %-100 %] 100 %  SpO2:  [95 %-100 %] 98 %  283 lbs 11.2 oz    Constitutional: Awake, drowsy, cooperative, no apparent distress.  HEENT: Moist mucous membranes, normal dentition, conjunctiva and pupils examined and normal  Respiratory: Crackles auscultated bilaterally diminished lung sounds noted to the right lower lobe, chest tube present and patent with sanguinous drainage  Cardiovascular: Regular rate and rhythm, normal S1 and S2, and no murmur noted, warm and well-perfused.  GI: Soft, non-distended, non-tender, hypoactive bowel sounds.  Lymph/Hematologic: No anterior cervical or supraclavicular adenopathy.  Skin: No rashes, no cyanosis, no edema.  Musculoskeletal: No joint swelling, erythema or tenderness.  Neurologic: Cranial nerves 2-12 intact, normal strength and sensation.  Psychiatric: Alert, oriented to person, place and time, no obvious anxiety or depression.    Data   -Data reviewed today: All pertinent laboratory and imaging results from this encounter were reviewed. I personally reviewed no images or EKG's today.    Recent Labs  Lab 01/05/18  1625 01/05/18  1424 01/05/18  0840   WBC 13.4* 16.5* 9.3    HGB 11.6* 9.2* 13.4   MCV 90 97 96    189 195   INR  --  1.11 0.92   NA  --   --  139   POTASSIUM  --   --  3.8   CHLORIDE  --   --  105   CO2  --   --  26   BUN  --   --  23   CR  --   --  0.78   ANIONGAP  --   --  8   EVIE  --   --  9.4   GLC  --   --  116*       Imaging:  Recent Results (from the past 24 hour(s))   XR Chest Port 1 View    Narrative    XR CHEST PORT 1 VW 1/5/2018 1:25 PM    HISTORY: Postop.    COMPARISON: 2/16/2015    FINDINGS: Right chest tube is in place. No pneumothorax. Small amount  of subcutaneous emphysema. Mild atelectasis at the lung bases.      Impression    IMPRESSION: Right chest tube without pneumothorax.    CASPER CHRISTIANSON MD   XR Chest Port 1 View    Narrative    XR CHEST PORT 1  1/5/2018 4:09 PM    HISTORY: Repeat CXR after 2nd surgery.    COMPARISON: 1/5/2018 at 11:57    FINDINGS: Right chest tube is in place. No definite pneumothorax.  Small amount of subcutaneous emphysema. Low lung volume with mild  atelectasis at the left lung base.      Impression    IMPRESSION: Right chest tube without pneumothorax.    CASPER CHRISTIANSON MD

## 2018-01-06 NOTE — OP NOTE
DATE OF PROCEDURE:  01/05/2018      SURGEON:  Thiago Villavicencio MD      ASSISTANT:  Jennifer Rodriguez PA-C      PREOPERATIVE DIAGNOSIS:  Postoperative bleeding, status post right lower lobectomy.      POSTOPERATIVE DIAGNOSIS:  Postoperative bleeding, status post right lower lobectomy.      PROCEDURE:  Redo right thoracotomy with evacuation of hemothorax and hemostasis.      ANESTHESIA:  General with double endotracheal tube.      INDICATIONS:  Pat Haley is a 72-year-old woman who just underwent earlier today a right lower lobectomy for a non-small cell carcinoma.  In the PACU, the patient was found to have significant chest tube output which was bloody.  Vital signs were stable, but based on the findings and the ongoing output of bloody drainage elected to proceed with reexploration.      DESCRIPTION OF PROCEDURE:  The patient was brought to the OR and placed in supine position.  Under general anesthesia with double-lumen endotracheal tube, the patient was placed in the left lateral decubitus position.  Steri-Strips were removed.  The right chest was prepared and draped in the usual fashion using ChloraPrep.  The thoracotomy incision was reopened.  There was approximately 100 mL of clots in the chest cavity, which was removed.  Examination revealed that the bleeding was coming from a very small bronchial artery close to the stump of the right lower lobe bronchus.  Multiple clips were applied with excellent hemostasis.  The pleural cavity was thoroughly irrigated.  There was no bleeding from the stump of the inferior pulmonary vein or the stump of the pulmonary artery.  There was no bleeding from the subcarinal space or right paratracheal area.  The pleural cavity was thoroughly irrigated.  On-Q catheter were repositioned the chest tube was flushed and repositioned with the tip toward the apex.  The chest wall and the intercostal space was dry with no evidence of any other sites of bleeding on complete  exploration.  The thoracotomy incision was closed with pericostal suture of Vicryl #1, running #1 Vicryl in muscular layer, running 2-0 Vicryl in the subcutaneous tissue and the skin closed with Insorb staples.  Estimated blood loss minimal.  Needle and sponge counts correct.      Jennifer Rodriguez PA-C, was the first assistant during the procedure.  Her role as first assistant was essential and necessary in accomplishing the step of the procedure as described above, providing exposure and retraction.         JUSTIN YU MD             D: 2018 14:44   T: 2018 15:32   MT: [id number]      Name:     YAMILA BRYAN   MRN:      [id number]        Account:        [id number]   :      1945           Procedure Date: 2018      Document: [id number]

## 2018-01-06 NOTE — PROGRESS NOTES
THORACIC SURGERY POD # 1    Doing well   VSS on RA  U/O borderline, received bolus 500 ml NS during the night  No bleeding  No air leak  CXR r lung expanded, pleural space well drained    Creat 1.57 this am  Hgb 9.3    Ambulate x 6-8/day  Monitor U/O  Give albumin 5% 500 ml over 2 hrs  resp care ++    Discussed findings and procedures done    JUSTIN YU MD [facility] ONCOLOGY THORACIC SURGERY  CELL:  [phone]  OFFICE: [phone]

## 2018-01-06 NOTE — PLAN OF CARE
Problem: Lung Surgery (via Thoracotomy) (Adult)  Goal: Signs and Symptoms of Listed Potential Problems Will be Absent, Minimized or Managed (Lung Surgery)  Signs and symptoms of listed potential problems will be absent, minimized or managed by discharge/transition of care (reference Lung Surgery (via Thoracotomy) (Adult) C[facility]).  A/O though quite drowsy overnight.  BP okay, went soft--MD notified with bolus given.  Urine output low.  BP improved but urine output remained low.  Pt drinking well, urine output remained low at 50 cc for overnight shift.  Garces left in place at this time for accurate measurement.  Tele is SR.  CT output sanguinous.  Pt swallowing without difficulty.  Scheduled tylenol and ibuprofen given overnight--no other pain meds.  Pt dangled and stood at bedside during noc, tolerated okay until she got dizzy and was put back to bed.

## 2018-01-06 NOTE — PROGRESS NOTES
Cross Cover:     Patient has had decreased urine output with soft pressures.  Will give a small fluid bolus and see how she responds.     Fortino Patel DO   Hospitalist

## 2018-01-06 NOTE — PLAN OF CARE
Pain a 2 at neck and surgical site capnography stable readings O2 at 2 liters to maintain sats above 90. Chest tubes in tact no new drainage at site serosanguinous drainage in tube.chest tube Slight air leak with cough no crepitus. No nausea.

## 2018-01-06 NOTE — OP NOTE
DATE OF PROCEDURE:  01/05/2018      SURGEON:  Thiago Villavicencio MD      ASSISTANT:  Jennifer Rodriguez PA-C      PREOPERATIVE DIAGNOSIS:  Right upper lobe lung nodule.      POSTOPERATIVE DIAGNOSIS:  Right upper lobe lung nodule.      PROCEDURE:   1.  Right thoracotomy.   2.  Wedge resection of right lower lobe lung nodule.   3.  Right lower lobectomy.   4.  Mediastinal lymph node dissection.      ANESTHESIA:  General with double endotracheal tube.      INDICATIONS:  Pat Haley is a 72-year-old woman who underwent a left video-assisted thoracoscopy and wedge resection of a non-small cell carcinoma, left upper lobe lung a few years ago.  She has been followed on a regular basis.  Her most recent CT scan showed a new nodule medial aspect of the right lower lobe lung base.  This nodule is hypermetabolic on the PET CT scan.  There is no evidence of ulysses or distant disease.  Her pulmonary function tests are adequate.  Based on the findings, a limited thoracotomy and resection is indicated for diagnosis and treatment.      DESCRIPTION OF PROCEDURE:  The patient was brought to the OR and placed in supine position.  Under general anesthesia with double-lumen endotracheal tube, the patient was placed in the left lateral decubitus position.  The right chest was prepared and draped in the usual fashion using ChloraPrep.  Ventilation of the right lung was discontinued.  A limited right thoracotomy was made.  The pleural space was entered in the fifth intercostal space dividing the sixth rib posteriorly.  On exploration, the nodule was easily identified.  The inferior pulmonary ligament was mobilized.  The hilum was dissected circumferentially.  There was no pleural fluid or pleural nodule.  Diaphragm, hilum and mediastinum appears normal.  Careful palpation of the lung was otherwise unremarkable.  The subcarinal lymph nodes were excised and submitted for frozen section.  This was negative.  The right paratracheal lymph  nodes were excised and submitted for permanent section.  Then, using multiple applications of [place] 60 gold stapling device, a wedge resection of the right lower lobe lung nodule was done.  The margin was close because of the location of the nodule close to the inferior pulmonary vein.  Frozen section revealed a non-small cell carcinoma.  Final diagnosis is deferred to permanent section.  Because of the close margin elected to proceed with a right lower lobectomy.  The fissure was entered, exposing the pulmonary artery.  The pulmonary artery was dissected circumferentially and the basilar trunk was divided with application of [place] 35 mm vascular stapling device.  Then the inferior pulmonary vein was dissected circumferentially and stapled in similar fashion.  Then the origin of right lower lobe bronchus was dissected circumferentially and stapled with applications of [place] 60 mm gold stapling device, completing the right lower lobectomy.  Bronchial stump was airtight at a pressure of 20 cm of water.  There was an excellent re-expansion of the upper and middle lobe lung.  There was no air leak.  Hemostasis was verified and was excellent.  Through a separate stab wound, a 28 straight chest tube was placed with tip directed toward the apex posteriorly.  On-Q catheters were placed.  30 mL Marcaine 0.5% without epinephrine was injected as intercostal blocks.  Thoracotomy incision was closed with pericostal suture of Vicryl #1, running #1 Vicryl in muscular layer, running 2-0 Vicryl in the subcutaneous tissues, closed with Insorb staples.  Estimated blood loss minimal.  Needle and sponge counts correct.      Jennifer Rodriguez PA-C, was the first assistant during the procedure.  Her role as first assistant was essential and necessary in accomplishing the step of the procedure as described above, providing exposure and retraction.         JUSTIN YU MD             D: 01/06/2018 14:40   T: 01/06/2018 15:28    MT: [id number]      Name:     YAMILA BRYAN   MRN:      [id number]        Account:        [id number]   :      1945           Procedure Date: 2018      Document: [id number]

## 2018-01-06 NOTE — PROGRESS NOTES
[facility]    Hospitalist Progress Note    Assessment & Plan   Pat Haley is a 72 year old female, with a past medical history of lung cancer, retinal vein occlusion, hypertension, depression, osteoarthritis of knees, degenerative disc disease, GERD, urinary incontinence, restless leg syndrome, osteo-arthritis, obstructive sleep apnea with CPAP use, COPD and hyperlipidemia who was admitted on 1/5/2018 for right thoracotomy, wedge resection of right lower lobe, right lower lobectomy, mediastinal lymph node dissection and subsequent hemothorax on the bronchial artery.        Hypertension:  -patient noted to be hypotensive in the PACU after initial procedure and was taken back for increased chest tube output  -renal function worse this morning, blood pressure still soft, discontinue losartan    Acute kidney injury:  -creatinine worsened from normal to 1.57 this morning  -patient was hypotensive in the PACU  - received 500 ML normal saline bolus and 500 mL of albumin  -continue normal saline at 125 ML per hour  -recheck creatinine in the morning  -discontinue ibuprofen and losartan    Depression   -PTA wellbutrin and fluoxetine  -continue wellbutrin and fluoxetine     GERD  -continue PTA omeprazole     Obstructive sleep apnea   -CPAP per home settings    COPD   -continue PRN albuterol inhaler    Hyperlipidemia  -continue PTA atorvastatin     Urinary incontinence   -continue PTA oxybutynin     Restless leg syndrome  -continue PTA requip      D/W: RN  DVT Prophylaxis: Enoxaparin (Lovenox) SQ  Code Status: Full Code    Disposition: Expected discharge per primary    Dhruv Barrera MD    Interval History   feels weak and tired overall, blood pressure soft. Renal function worse. Denies chest pain. Low urine output    -Data reviewed today: I reviewed all new labs and imaging results over the last 24 hours. I personally reviewed no images or EKG's today.    Physical Exam   Temp: 97.5  F (36.4  C)  Temp src: Oral BP: 93/44   Heart Rate: 81 Resp: 16 SpO2: 94 % O2 Device: None (Room air) Oxygen Delivery: 2 LPM  Vitals:    01/05/18 0830 01/06/18 0551   Weight: 128.7 kg (283 lb 11.2 oz) 124.7 kg (275 lb)     Vital Signs with Ranges  Temp:  [96.1  F (35.6  C)-98.3  F (36.8  C)] 97.5  F (36.4  C)  Heart Rate:  [] 81  Resp:  [12-26] 16  BP: ()/(44-99) 93/44  FiO2 (%):  [96 %-100 %] 96 %  SpO2:  [92 %-100 %] 94 %  I/O last 3 completed shifts:  In: 2343 [P.O.:660; I.V.:1383]  Out: 1261 [Urine:800; Chest Tube:461]    Constitutional: AAOX3, slightly sleepy but very easily arousable  HEENT: No pallor or icterus  Respiratory: chest tube on the right side, normal effort of breathing  Cardiovascular: RRR, No murmur  GI: Soft, Non- tender, BS- normoactive, No Guarding/rebound/rigidity  Skin/Integument: Warm and dry, no rashes  MSK: No joint deformity or swelling, no edema  Neuro: CN- grossly intact      Medications     - MEDICATION INSTRUCTIONS -       NaCl 75 mL/hr at 01/05/18 2013       atorvastatin (LIPITOR) tablet 20 mg  20 mg Oral At Bedtime     buPROPion  300 mg Oral QAM     FLUoxetine (PROzac) capsule 40 mg  40 mg Oral Daily     Glycopyrrolate-Formoterol  2 puff Inhalation BID     nystatin   Topical BID     omeprazole  20 mg Oral QAM AC     oxybutynin  10 mg Oral Daily     rOPINIRole  1 mg Oral QAM     rOPINIRole  2 mg Oral At Bedtime     senna-docusate  1-2 tablet Oral BID     sodium chloride (PF)  3 mL Intracatheter Q8H     bacitracin   Topical TID     acetaminophen  975 mg Oral Q8H     enoxaparin  40 mg Subcutaneous Q24H       Data     Recent Labs  Lab 01/06/18  0833 01/05/18  1625 01/05/18  1424 01/05/18  0840   WBC  --  13.4* 16.5* 9.3   HGB 9.3* 11.6* 9.2* 13.4   MCV  --  90 97 96   PLT  --  179 189 195   INR  --   --  1.11 0.92     --   --  139   POTASSIUM 4.5  --   --  3.8   CHLORIDE 106  --   --  105   CO2 23  --   --  26   BUN 28  --   --  23   CR 1.57* 0.81  --  0.78   ANIONGAP 10  --   --   8   EVIE 7.6*  --   --  9.4   *  --   --  116*       Recent Results (from the past 24 hour(s))   XR Chest Port 1 View    Narrative    XR CHEST PORT 1 VW 1/5/2018 4:09 PM    HISTORY: Repeat CXR after 2nd surgery.    COMPARISON: 1/5/2018 at 11:57    FINDINGS: Right chest tube is in place. No definite pneumothorax.  Small amount of subcutaneous emphysema. Low lung volume with mild  atelectasis at the left lung base.      Impression    IMPRESSION: Right chest tube without pneumothorax.    CASPER CHRISTIANSON MD   XR Chest Port 1 View    Narrative    CHEST ONE VIEW PORTABLE   1/6/2018 5:25 AM     HISTORY: Status post right lower lobectomy.    COMPARISON: 1/5/2018.      Impression    IMPRESSION: Previously noted subcutaneous emphysema right chest wall  has improved slightly. No other significant interval change. Single  right chest tube. No pneumothorax. Heart size appears stable. Shallow  inspiration.    ADALID VALERA MD

## 2018-01-07 NOTE — PROGRESS NOTES
THORACIC SURGERY POD #2    Stable  AVSS on  RA  No bleeding  No air leak  Serous CT output  U/O improved  Wound ok    CXR r lung expanded, no effusion  Creat stable  Hgb 7.6      Recheck Hgb, BMP in am  Ambulate  resp care ++  CT suction for now    JUSTIN YU MD [facility] ONCOLOGY THORACIC SURGERY  CELL:  [phone]  OFFICE: [phone]

## 2018-01-07 NOTE — PLAN OF CARE
Problem: Lung Surgery (via Thoracotomy) (Adult)  Goal: Signs and Symptoms of Listed Potential Problems Will be Absent, Minimized or Managed (Lung Surgery)  Signs and symptoms of listed potential problems will be absent, minimized or managed by discharge/transition of care (reference Lung Surgery (via Thoracotomy) (Adult) CPG).   VSS,  A/O, drowsy, falls asleep easily.  Urine output finally picking up--275 out in last 4 hours.  CT output serosang decreasing.  Pt requiring small amt oxygen to keep sats above 89%.  IS to approx 600 with much encouragement.  Pt finally able to cough up sputum plug--resp effort better.  Enjoyed sitting in chair--needs to walk more.

## 2018-01-07 NOTE — PLAN OF CARE
Problem: Patient Care Overview  Goal: Plan of Care/Patient Progress Review  Outcome: Improving  A/Ox4, VSS, tolerating reg diet, A-1 with walker, ambulated halls X5 this shift. Pt c/o SOB with ambulation. IS to 750, needs encouragement. incisoin with steri strips, CDI. CTx1 with 375 cc serosanguinous output. LS course. BS hypo, no flatus. Garces patent, low urine output this shift. MD updated, IV fluids increased to 125ml/hr, 500cc albumin infused, CTM. Fluids encouraged. pain controlled with tylenol at this time. Pulses +2, CMS intact. On-Q in place.

## 2018-01-07 NOTE — PROGRESS NOTES
[facility]    Hospitalist Progress Note    Assessment & Plan   Pat Haley is a 72 year old female, with a past medical history of lung cancer, retinal vein occlusion, hypertension, depression, osteoarthritis of knees, degenerative disc disease, GERD, urinary incontinence, restless leg syndrome, osteo-arthritis, obstructive sleep apnea with CPAP use, COPD and hyperlipidemia who was admitted on 1/5/2018 for right thoracotomy, wedge resection of right lower lobe, right lower lobectomy, mediastinal lymph node dissection and subsequent hemothorax on the bronchial artery.        Hypertension:  -patient noted to be hypotensive in the PACU and intermittently in the floors   -BP still on the softer side  -continue to hold losartan    Acute kidney injury:  -creatinine worsened from normal to 1.57  1/6  -Cr stable this AM; Urine OP improving  -received adequate IVF last 24 hrs; now with early evidence of volume overload  -DC IVF  -monitor oxygenation; if worsening try prn lasix    Acute blood loss anemia(post-op) on chronic anemia:  -Hb dropped to 7.6; partly dilutional too  -recheck in AM    Depression   -continue wellbutrin and fluoxetine     GERD  -continue PTA omeprazole     Obstructive sleep apnea   -CPAP per home settings    COPD   -continue PRN albuterol inhaler    Hyperlipidemia  -continue PTA atorvastatin     Urinary incontinence   -continue PTA oxybutynin     Restless leg syndrome  -continue PTA requip      D/W: RN  DVT Prophylaxis: Enoxaparin (Lovenox) SQ  Code Status: Full Code    Disposition: Expected discharge per primary    Dhruv Barrera MD    Interval History   Renal function stable. Requiring O2 at 2 L. feels weak and tired overall, blood pressure soft.     -Data reviewed today: I reviewed all new labs and imaging results over the last 24 hours. I personally reviewed no images or EKG's today.    Physical Exam   Temp: 98.8  F (37.1  C) Temp src: Oral BP: 95/44 Pulse: 90 Heart Rate:  89 Resp: 18 SpO2: 90 % O2 Device: None (Room air) Oxygen Delivery: 2 LPM  Vitals:    01/05/18 0830 01/06/18 0551   Weight: 128.7 kg (283 lb 11.2 oz) 124.7 kg (275 lb)     Vital Signs with Ranges  Temp:  [97.6  F (36.4  C)-98.8  F (37.1  C)] 98.8  F (37.1  C)  Pulse:  [85-95] 90  Heart Rate:  [89-92] 89  Resp:  [18-20] 18  BP: ()/(44-53) 95/44  SpO2:  [86 %-95 %] 90 %  I/O last 3 completed shifts:  In: 2431 [P.O.:1610; I.V.:821]  Out: 1220 [Urine:675; Chest Tube:545]    Constitutional: AAOX3, slightly sleepy but very easily arousable  HEENT: No pallor or icterus  Respiratory: chest tube on the right side, coarse BS b/l with crackles  Cardiovascular: RRR, No murmur  GI: Soft, Non- tender, BS- normoactive  Skin/Integument: Warm and dry, no rashes  MSK: No joint deformity or swelling, no edema  Neuro: CN- grossly intact      Medications     - MEDICATION INSTRUCTIONS -       NaCl 100 mL/hr at 01/07/18 1023       atorvastatin (LIPITOR) tablet 20 mg  20 mg Oral At Bedtime     buPROPion  300 mg Oral QAM     FLUoxetine (PROzac) capsule 40 mg  40 mg Oral Daily     Glycopyrrolate-Formoterol  2 puff Inhalation BID     nystatin   Topical BID     omeprazole  20 mg Oral QAM AC     oxybutynin  10 mg Oral Daily     rOPINIRole  1 mg Oral QAM     rOPINIRole  2 mg Oral At Bedtime     senna-docusate  1-2 tablet Oral BID     sodium chloride (PF)  3 mL Intracatheter Q8H     bacitracin   Topical TID     acetaminophen  975 mg Oral Q8H     enoxaparin  40 mg Subcutaneous Q24H       Data     Recent Labs  Lab 01/07/18  0730 01/06/18  0833 01/05/18  1625 01/05/18  1424 01/05/18  0840   WBC 9.7  --  13.4* 16.5* 9.3   HGB 7.6* 9.3* 11.6* 9.2* 13.4   MCV 97  --  90 97 96   *  --  179 189 195   INR  --   --   --  1.11 0.92    139  --   --  139   POTASSIUM 4.0 4.5  --   --  3.8   CHLORIDE 107 106  --   --  105   CO2 23 23  --   --  26   BUN 33* 28  --   --  23   CR 1.63* 1.57* 0.81  --  0.78   ANIONGAP 8 10  --   --  8   EVIE 7.3*  7.6*  --   --  9.4   * 161*  --   --  116*       Recent Results (from the past 24 hour(s))   XR Chest Port 1 View    Narrative    XR CHEST PORT 1 VW 1/7/2018 5:45 AM    COMPARISON: 1/6/2018    HISTORY: Right lower lobectomy      Impression    IMPRESSION: Postoperative changes in the right chest with right chest  tube in place. Possible trace pleural effusions on both sides. No  pneumothorax is seen on either side. Subcutaneous emphysema over the  right chest wall and neck again seen.    MADELIN DAVIS MD

## 2018-01-07 NOTE — PLAN OF CARE
Problem: Lung Surgery (via Thoracotomy) (Adult)  Goal: Signs and Symptoms of Listed Potential Problems Will be Absent, Minimized or Managed (Lung Surgery)  Signs and symptoms of listed potential problems will be absent, minimized or managed by discharge/transition of care (reference Lung Surgery (via Thoracotomy) (Adult) CPG).   A/O, VSS.  Up ind in room.  Incisions CDI, passing flatus.  Watched rest of videos.  Plans for d/c to TCU today.

## 2018-01-08 NOTE — PLAN OF CARE
Problem: Patient Care Overview  Goal: Plan of Care/Patient Progress Review  Outcome: Improving  A/Ox4, VSS. 2L O2 needed with ambulation, patient de-sats down to 82% on room air with ambulation, pt visibly SOB with exertion. Pt c/o dizziness with ambulation, continue to encourage mobility. PT orders in place. IS to 500, have to strongly/continually encourage use. Ambulated halls 5x this shift, up in chair between walks. Fine crackles in bilateral lung fields. 480cc SS output in CT, no AL/crepitus. incisions with steri strips cdi, minimal SS drainage. BS hypo, passing minimal flatus. Poor appetite today. Pt states pain tolerable with tylenol, level increases during activity. Pulses +1, CMS intact. hospitalist aware of creatinine increase to 1.63 , Hgb 7.6. Continue to monitor.

## 2018-01-08 NOTE — PLAN OF CARE
"Problem: Patient Care Overview  Goal: Plan of Care/Patient Progress Review  Discharge Planner PT    Evaluation completed, treatment initiated. 71 yo female s/p R thoracotomy with wedge resection, RLL nodule resection and mediastinal lymph node dissection. Pt with return to OR for R thoracotomy re-do secondary to increased bloody CT output. Post op intermittent hypotension and anemia.   Notes indicate okay to ambulate on water seal even with intermittent air leak.   Resides in a Town home, \"handicap accessible,\" lives alone. Uses a single end cane for mobility. Has cleaning service on Fridays. Has family and friends assist with shopping and driving. Manages meds independently, states some are delivered and others are picked up.  Patient plan for discharge: Home if able, not opposed to rehab or assist at home.   Current status: Pt very sleepy, nodding off with conversation, but perks back up and states \"I think it's the pain meds, I just can't keep my eyes open.\"   Min A up to stand, needs 3 tries before achieving enough weight shift to stand. Woozy with standing, on 1L via nc. Sates \"I just need to go to bed.\" TOLENTINO. Bed>chair with WW and single step cues, CGA,. Sit>supine Mod A x 2.  Pt with O2 at 78%, lips slightly blue, pt more lethargic, RN present okays increase in O2. Cued to breathe in via nose, pt is a mouth breather. RN placed simple facemask with O2 to 96% on 3L. Reduced back to 1L at 91% upon PT exit. RN at bedside.   Barriers to return to prior living situation: Lives alone, level of assist, fatigue, TOLENTINO, weakness. Fall risk.  Recommendations for discharge: TCU  Rationale for recommendations: Pt will benefit from skilled rehab services to increase functional activity tolerance and strength in order to progress independence and safety with mobility.       Entered by: Ling Garza 01/08/2018 11:32 AM           "

## 2018-01-08 NOTE — PLAN OF CARE
Problem: Patient Care Overview  Goal: Plan of Care/Patient Progress Review  Outcome: Improving  A/O. VSS on1L NC; Sat 89-91% (84% on RA). LS expiratory wheezes. CT with intermittent air leak with cough. Thoracotomy site replaced steri-strips of upper portion of incision; scant drainage in lower portion of incision. IS to 500. On-Q intact, sensors taped and unclamped. Dyspneic with exertion. Garces patent. BS active; no flatus. Up with 2 and walker.

## 2018-01-08 NOTE — PROGRESS NOTES
"Thoracic Surgery POD #3:  /49 (BP Location: Left arm)  Pulse 85  Temp 97.4  F (36.3  C) (Oral)  Resp 18  Ht 1.575 m (5' 2\")  Wt 124.7 kg (275 lb)  SpO2 (!) 84%  BMI 50.3 kg/m2  On 2 liters NC  UOP:  1125 cc over 24 hours  CT: serosang. 710 cc over 24 hours, no air leak for me with cough but RN reports some intermittent AL  Final path: pending  Creat: 1.22 (downtrending)  S:  Not hungry- pain controlled- still using oxygen- up with RN and PT- discussed lasix, ambulation, IS and flutter valve, pending path.  O:  Inc.: minimal ecchymosis, no swelling, dry  On-Q: infusing  CTs: as above  P:  10 mg lasix po once  DC Garces  Flutter valve  Continue CT suction-- OK to ambulate on water seal even if intermittent AL    Jennifer Rodriguez PA-C with Dr. Thiago Villavicencio  [facility]  Cell [phone]    Addendum: Acute hypoxic respiratory failure as expected s/p right lower lobectomy  "

## 2018-01-08 NOTE — PROGRESS NOTES
[facility]    Hospitalist Progress Note    Assessment & Plan   Pat Haley is a 72 year old female, with a past medical history of lung cancer, retinal vein occlusion, hypertension, depression, osteoarthritis of knees, degenerative disc disease, GERD, urinary incontinence, restless leg syndrome, osteo-arthritis, obstructive sleep apnea with CPAP use, COPD and hyperlipidemia who was admitted on 1/5/2018 for right thoracotomy, wedge resection of right lower lobe, right lower lobectomy, mediastinal lymph node dissection.    Hypertension:  -patient noted to be hypotensive in the PACU and intermittently in the floors   -BP better today  -continue to hold losartan    Acute kidney injury:  -creatinine worsened from normal to 1.57 on 1/6  -Cr better this morning at 1.22; Urine OP improving  -off IV fluids  -probably is volume overloaded, I agree with low dose Lasix  -monitor oxygenation; currently weaned down to 1 L, desaturate with activity    Acute blood loss anemia(post-op) on chronic anemia:  -Hb dropped to 7.6; partly dilutional too  -stable at 7.5    Depression   -continue wellbutrin and fluoxetine     GERD  -continue PTA omeprazole     Obstructive sleep apnea   -CPAP per home settings    COPD   -continue PRN albuterol inhaler    Hyperlipidemia  -continue PTA atorvastatin     Urinary incontinence   -continue PTA oxybutynin     Restless leg syndrome  -continue PTA requip      D/W: RN  DVT Prophylaxis: Enoxaparin (Lovenox) SQ  Code Status: Full Code    Disposition: Expected discharge per primary    Dhruv Barrera MD    Interval History   Renal function improving. Oxygen requirement stable. Continues to feel feels weak and tired overall, although slightly better, blood pressure slightly better today.    -Data reviewed today: I reviewed all new labs and imaging results over the last 24 hours. I personally reviewed no images or EKG's today.    Physical Exam   Temp: 97.4  F (36.3  C) Temp src:  Oral BP: 116/49 Pulse: 85 Heart Rate: 89 Resp: 18 SpO2: (!) 84 % O2 Device: Nasal cannula Oxygen Delivery: 1 LPM  Vitals:    01/05/18 0830 01/06/18 0551   Weight: 128.7 kg (283 lb 11.2 oz) 124.7 kg (275 lb)     Vital Signs with Ranges  Temp:  [97.3  F (36.3  C)-99.7  F (37.6  C)] 97.4  F (36.3  C)  Pulse:  [81-97] 85  Heart Rate:  [67-89] 89  Resp:  [16-18] 18  BP: (104-118)/(43-56) 116/49  SpO2:  [84 %-96 %] 84 %  I/O last 3 completed shifts:  In: 1210 [P.O.:1210]  Out: 1835 [Urine:1125; Chest Tube:710]    Constitutional: AAOX3, weak and tired overall  HEENT: No pallor or icterus  Respiratory: chest tube on the right side, coarse BS b/l with crackles  Cardiovascular: RRR, No murmur  GI: Soft, Non- tender, BS- normoactive  Skin/Integument: Warm and dry, no rashes  MSK: No joint deformity or swelling, no edema  Neuro: CN- grossly intact      Medications     - MEDICATION INSTRUCTIONS -         furosemide  10 mg Oral Once     atorvastatin (LIPITOR) tablet 20 mg  20 mg Oral At Bedtime     buPROPion  300 mg Oral QAM     FLUoxetine (PROzac) capsule 40 mg  40 mg Oral Daily     Glycopyrrolate-Formoterol  2 puff Inhalation BID     nystatin   Topical BID     omeprazole  20 mg Oral QAM AC     oxybutynin  10 mg Oral Daily     rOPINIRole  1 mg Oral QAM     rOPINIRole  2 mg Oral At Bedtime     senna-docusate  1-2 tablet Oral BID     sodium chloride (PF)  3 mL Intracatheter Q8H     bacitracin   Topical TID     acetaminophen  975 mg Oral Q8H     enoxaparin  40 mg Subcutaneous Q24H       Data     Recent Labs  Lab 01/08/18  0712 01/07/18  0730 01/06/18  0833 01/05/18  1625 01/05/18  1424 01/05/18  0840   WBC 9.4 9.7  --  13.4* 16.5* 9.3   HGB 7.5* 7.6* 9.3* 11.6* 9.2* 13.4   MCV 97 97  --  90 97 96    145*  --  179 189 195   INR  --   --   --   --  1.11 0.92    138 139  --   --  139   POTASSIUM 3.9 4.0 4.5  --   --  3.8   CHLORIDE 105 107 106  --   --  105   CO2 24 23 23  --   --  26   BUN 34* 33* 28  --   --  23   CR  1.22* 1.63* 1.57* 0.81  --  0.78   ANIONGAP 8 8 10  --   --  8   EVIE 7.9* 7.3* 7.6*  --   --  9.4   * 117* 161*  --   --  116*       Recent Results (from the past 24 hour(s))   XR Chest Port 1 View    Narrative    XR CHEST PORT 1 VW  1/8/2018 4:55 AM     HISTORY:  s/p right lower lobectomy;     COMPARISON: Film dated 1/7/2018    FINDINGS:  Right chest tube is in place. No pneumothorax is  identified. Left basilar opacity is unchanged. There is silhouetting  of the left heart border.      Impression    IMPRESSION: Right chest tube. No pneumothorax. No change.    PAOLA MURPHY MD

## 2018-01-09 LAB — INTERPRETATION ECG - MUSE: NORMAL

## 2018-01-09 NOTE — PROGRESS NOTES
"   01/08/18 1057   Quick Adds   Type of Visit Initial PT Evaluation   Living Environment   Lives With alone   Living Arrangements house   Home Accessibility grab bars present (toilet);grab bars present (bathtub)   Self-Care   Equipment Currently Used at Home tub bench;cane, straight;raised toilet;grab bar   Functional Level Prior   Ambulation 1-->assistive equipment   Transferring 1-->assistive equipment   Toileting 1-->assistive equipment   Bathing 1-->assistive equipment   Dressing 0-->independent   Eating 0-->independent   Communication 0-->understands/communicates without difficulty   Swallowing 0-->swallows foods/liquids without difficulty   Cognition 0 - no cognition issues reported   Fall history within last six months yes   Number of times patient has fallen within last six months 2   Which of the above functional risks had a recent onset or change? ambulation;transferring;dressing;toileting;bathing   Prior Functional Level Comment Cleaning assist 1x per week. Barp the cleaning lady goes to the grocery on Fridays. Manages medications on her own. Some deliverd some she picks up. Daughter drives or frined melida drives for apts.  Slipped on the ice in [address] way, and in [facility] garage   General Information   Onset of Illness/Injury or Date of Surgery - Date 01/05/18   Referring Physician Thiago Villavicencio MD   Patient/Family Goals Statement \"I'd like to go home, but I'm not opposed to help or rehab if I need it.\"   Pertinent History of Current Problem (include personal factors and/or comorbidities that impact the POC) 73 yo female POD # 2 73 yo female s/p R thoracotomy with wedge resection, RLL nodule resection and mediastinal lymph node dissection. Pt with return to OR for R thoracotomy re-do secondary to increased bloody CT output. Post op intermittent hypotension and anemia.   Precautions/Limitations fall precautions;oxygen therapy device and L/min  (1L at rest)   General Observations Sleepy, head " "\"bobbing,\" awakens with ease.   Cognitive Status Examination   Orientation orientation to person, place and time   Level of Consciousness alert   Follows Commands and Answers Questions 75% of the time;100% of the time   Personal Safety and Judgment impulsive   Cognitive Comment Follows commands, but secondary to TOLENTINO and feeling \"woozy\" is in a rush to get to the bed and lay down,   Integumentary/Edema   Integumentary/Edema Comments Slight pitting at sock lines B. R thoracotomy incision with on Q in place.   Posture    Posture Forward head position;Protracted shoulders;Kyphosis   Range of Motion (ROM)   ROM Comment B LEs WLF as obs via mobiltiy, B UEs with limited range to shy of 90 at shoulder flexion B. LMimted by pain on the R   Strength   Strength Comments Demonstrates antigravity strength wtih mobiltiy, fatigues quickly. Limited activity tolerance   Bed Mobility   Bed Mobility Comments Sit>supine Mod A x 2   Transfer Skills   Transfer Comments Sit>stand Min A   Gait   Gait Comments Bedside gait with WW and CGA, Mod A for walker navigation.   Balance   Balance Comments Sitting balance fair +, can not reach to the floor an dback, Standing balance requires B UE support from the walker or thearpist for stabitliy.     General Therapy Interventions   Planned Therapy Interventions balance training;bed mobility training;gait training;neuromuscular re-education;ROM;strengthening;stretching;transfer training;progressive activity/exercise   Clinical Impression   Criteria for Skilled Therapeutic Intervention yes, treatment indicated   PT Diagnosis Impaired functional activity tolerance   Influenced by the following impairments TOLENTINO, fatigue, post op weakness, decreased UE ROM and pain   Functional limitations due to impairments Decreased functional independence   Clinical Presentation Stable/Uncomplicated   Clinical Presentation Rationale see MR   Clinical Decision Making (Complexity) Low complexity   Therapy Frequency` " "daily   Predicted Duration of Therapy Intervention (days/wks) 5 days   Anticipated Discharge Disposition Transitional Care Facility   Risk & Benefits of therapy have been explained Yes   Patient, Family & other staff in agreement with plan of care Yes   [facility] TM \"6 Clicks\"   2016, Trustees of [facility], under license to [employer].  All rights reserved.   6 Clicks Short Forms Basic Mobility Inpatient Short Form   [facility]  \"6 Clicks\" V.2 Basic Mobility Inpatient Short Form   1. Turning from your back to your side while in a flat bed without using bedrails? 2 - A Lot   2. Moving from lying on your back to sitting on the side of a flat bed without using bedrails? 2 - A Lot   3. Moving to and from a bed to a chair (including a wheelchair)? 3 - A Little   4. Standing up from a chair using your arms (e.g., wheelchair, or bedside chair)? 3 - A Little   5. To walk in hospital room? 3 - A Little   6. Climbing 3-5 steps with a railing? 3 - A Little   Basic Mobility Raw Score (Score out of 24.Lower scores equate to lower levels of function) 16   Total Evaluation Time   Total Evaluation Time (Minutes) 10     "

## 2018-01-09 NOTE — PROGRESS NOTES
MD DEATH PRONOUNCEMENT    Called to pronounce Pat Haley dead.    Physical Exam: Unresponsive to noxious stimuli, Spontaneous respirations absent, Breath sounds absent, Carotid pulse absent, Heart sounds absent, Pupillary light reflex absent and Corneal blink reflex absent    Patient was pronounced dead is2621 2018.    Active Problems:    Malignant neoplasm of lower lobe of right lung (H)       Infectious disease present?: no    Communicable disease present? (examples: HIV, chicken pox, TB, Ebola, CJD) :  NO    Multi-drug resistant organism present? (example: MRSA): YES    Please consider an autopsy if any of the following exist:  YES Unexpected or unexplained death during or following any dental, medical, or surgical diagnostic treatment procedures.   NO Death of mother at or up to seven days after delivery.     NO All  and pediatric deaths.     NO Death where the cause is sufficiently obscure to delay completion of the death certificate.   NO Deaths in which autopsy would confirm a suspected illness/condition that would affect surviving family members or recipients of transplanted organs.     The following deaths must be reported to the 's Office:  YES A death that may be due entirely or in part to any factors other than natural disease (recent surgery, recent trauma, suspected abuse/neglect).   NO A death that may be an accident, suicide, or homicide.     yes Any sudden, unexpected death in which there is no prior history of significant heart disease or any other condition associated with sudden death.   NO A death under suspicious, unusual, or unexpected circumstances.    NO Any death which is apparently due to natural causes but in which the  does not have a personal physician familiar with the patient s medical history, social, or environmental situation or the circumstances of the terminal event.   NO Any death apparently due to Sudden Infant Death Syndrome.     YES  Deaths that occur during, in association with, or as consequences of a diagnostic, therapeutic, or anesthetic procedure.   NO Any death in which a fracture of a major bone has occurred within the past (6) six months.   NO A death of persons note seen by their physician within 120 days of demise.     NO Any death in which the  was an inmate of a public institution or was in the custody of Law Enforcement personnel.   NO  All unexpected deaths of children   NO Solid organ donors   NO Unidentified bodies   NO Deaths of persons whose bodies are to be cremated or otherwise disposed of so that the bodies will later be unavailable for examination;   NO Deaths unattended by a physician outside of a licensed healthcare facility or licensed residential hospice program   NO Deaths occurring within 24 hours of arrival to a health care facility if death is unexpected.    NO Deaths associated with the decedent s employment.   NO Deaths attributed to acts of terrorism.   NO Any death in which there is uncertainty as to whether it is a medical examiner s care should be discussed with the medical investigator.        Body disposition: Autopsy was discussed with family member:  Daughter in person.  Permission for autopsy was declined as per my discussion w/ Dr. Villavicencio

## 2018-01-09 NOTE — PROGRESS NOTES
Pt ambulated in hallway then requested to use bathroom on way back into room. Pt sat down on toilet felt dizzy/lightheaded, still conscious and verbal, a/ox4. Upon attempt to get back up to bed, pt became unconscious, with no pulse. Code blue called - see code note for details.     Two daughters and son in law present at bedside after time of death called. Bereavement tray provided.  services provided. All of patients belongings sent home with family members.

## 2018-01-09 NOTE — ANESTHESIA CARE TRANSFER NOTE
Patient: Pat Haley    * No procedures listed *    Diagnosis: * No pre-op diagnosis entered *  Diagnosis Additional Information: No value filed.    Anesthesia Type:   No value filed.     Note:  Airway :ETT  Destination: pt not transfered.  Comments: CPR in progress.       Vitals: (Last set prior to Anesthesia Care Transfer)              Electronically Signed By: TOM Munguia CRNA  January 8, 2018  8:08 PM

## 2018-01-09 NOTE — PROGRESS NOTES
THORACIC SURGERY    Code blue.  Pt found unresponsive in the bathroom.  Unsuccessful.    Discussed with pt's daughter and son in law.    Pathology report discussed  [id number].    JUSTIN YU MD [facility] ONCOLOGY THORACIC SURGERY  CELL:  [phone]  OFFICE: [phone]

## 2018-01-09 NOTE — CODE/RAPID RESPONSE
[facility]    RRT Note/code note  1/8/2018   Time Called: 1933  code called for: collapse on toilet, loss of pulse    72 year old female, with a past medical history of lung cancer, retinal vein occlusion, hypertension, depression, osteoarthritis of knees, degenerative disc disease, GERD, urinary incontinence, restless leg syndrome, osteo-arthritis, obstructive sleep apnea with CPAP use, COPD and hyperlipidemia who was admitted on 1/5/2018 for right thoracotomy, wedge resection of right lower lobe, right lower lobectomy, mediastinal lymph node dissection.    She is POD3 s/p above surgery who was feeling lightheaded on toilet per staff, attempted to walk back to bed, collapsed/syncope w/ loss of pulse.  Code blue called by staff    Code called at 1933, CPR was in progress upon my arrival.    Continued ACLS for >30 minutes on the floor w/ 10mg epi, 250mEq NaHCO3, 2g calcium.  PEA rhythm when zoll pads applied (initial pads had to be replaced 2/2 torn equipement).  Initial rhythm was PEA/sinus but w/ wide QRS, bicarb doses started thereafter w/ subsequent narrowing of QRS.  Intubated by anesthesia provider.  PEA persisted despite vol expansion.  Gluc WNL, 2L crystalloid infused, labs obtained, pH 7.05, hgb 6 (following intubation & initiation of CPR bloody returns from ETT & R chest tube (>500mL).  bilat LE IO caths placed, existing catheter was 22gx1.  Code was led by me, assisted by Dr. Joseph & ICU MD, latter of whom spoke w/ pt's family who initially desired continuation of resuscitative efforts.  [facility] Vicki briefly visited during ACLS.  With subsequent rhythm checks, rhythm was deteriorating to aleisha/PEA and later asystole.  At that point, given limited potential for recovery and prolonged resuscitative efforts of >30minutes, code was stopped after 3 providers agreed to do so.  [facility] [address] staff had already paged attending surgeon who presented to bedside and was updated on pt's condition.  Pt was  "pronounced  by me at       Assessment & Plan   IMPRESSION & PLAN:  Cardiopulmonary arrest    INTERVENTIONS:  ACLS as above    Discussed with and defer further cares to Dr. Joseph & Dr. Villavicencio    Code Status: Full Code    Apoorva Conley    Allergies   Allergies   Allergen Reactions     Tetanus Immune Globulin      \" almost killed me\"  High fever and in bed for 2 weeks -- told to never get another one       Physical Exam   Vital Signs with Ranges:  Temp:  [97.3  F (36.3  C)-98  F (36.7  C)] 97.8  F (36.6  C)  Pulse:  [85] 85  Heart Rate:  [86-89] 86  Resp:  [16-20] 18  BP: (113-121)/(49-70) 113/70  SpO2:  [84 %-95 %] 91 %  I/O last 3 completed shifts:  In: 1370 [P.O.:1370]  Out: 1795 [Urine:1075; Chest Tube:720]      IMAGING: (X-ray/CT/MRI)   Recent Results (from the past 24 hour(s))   XR Chest Port 1 View    Narrative    XR CHEST PORT 1 VW  2018 4:55 AM     HISTORY:  s/p right lower lobectomy;     COMPARISON: Film dated 2018    FINDINGS:  Right chest tube is in place. No pneumothorax is  identified. Left basilar opacity is unchanged. There is silhouetting  of the left heart border.      Impression    IMPRESSION: Right chest tube. No pneumothorax. No change.    PAOLA MURPHY MD       CBC with Diff:  Recent Labs   Lab Test  18   1424   WBC  14.6*   < >  16.5*   HGB  6.5*   < >  9.2*   MCV  99   < >  97   PLT  158   < >  189   INR   --    --   1.11    < > = values in this interval not displayed.      No results found for: RETICABSCT  No results found for: RETP    Lactic Acid:    Lab Results   Component Value Date    LACT 2.4 2018           Comprehensive Metabolic Panel:    Recent Labs  Lab 18      POTASSIUM 3.6   CHLORIDE 101   CO2 25   ANIONGAP 14   *   BUN 32*   CR 1.29*   GFRESTIMATED 40*   GFRESTBLACK 49*   EVIE 9.7   MAG 2.4*   PROTTOTAL PENDING   ALBUMIN 1.9*   BILITOTAL PENDING   ALKPHOS PENDING   AST PENDING   ALT 41       INR:    Recent Labs "   Lab Test  01/05/18   1424   INR  1.11         Time Spent on this Encounter   I spent 40 minutes on the unit/floor managing the care of Pat Haley.

## 2018-01-22 NOTE — DISCHARGE SUMMARY
THORACIC SURGERY HOSPITAL DEATH SUMMARY  [facility] ONCOLOGY - THORACIC SURGERY  [address], [address]  [place] [address]  Phone [phone]  [email or web address]    1/22/2018     Marshal Mandujano   [place] [address] [address] [facility]   Phone: [phone]   Fax: [phone]        Re: Pat Haley             1945             [id number]              Dates of Hospitalization: 1/5/2018 - 1/8/2018   Date of Service (when I saw the patient): 1/08/18.    Date of Death: 1/08/18    Dear Dr. Mandujano:     As you are aware, we cared for your patient,  Pat Haley here at [facility].  She was a 72-year-old woman who underwent a left video-assisted thoracoscopy and wedge resection of a non-small cell carcinoma, left upper lobe lung a few years ago.  She had been followed on a regular basis.  Her most recent CT scan showed a new nodule in the medial aspect of the right lower lobe lung base.  This nodule was hypermetabolic on the PET CT scan.  There was no evidence of ulysses or distant disease.  Her pulmonary function tests were adequate.  Based on the findings, a limited thoracotomy and resection was indicated for diagnosis and treatment.         On 1/5/2018, Dr. Thiago Villavicencio performed the following:    Procedure/Surgery Information   Procedure: 1.  Right thoracotomy.   2.  Wedge resection of right lower lobe lung nodule.   3.  Right lower lobectomy.   4.  Mediastinal lymph node dissection.    Surgeon(s): Surgeon(s) and Role:     * Thiago Villavicencio MD - Primary     * Jennifer Rodriguez PA-C - Assisting   Specimens:   ID Type Source Tests Collected by Time Destination   A : SUBCARINAL LYMPH NODES Tissue Lymph Node, Subcarinal (7) SURGICAL PATHOLOGY EXAM Thiago Villavicencio MD 1/5/2018 10:08 AM    B : RIGHT LOWER LOBE LUNG NODULE Tissue Lung, Right Lower Lobe SURGICAL PATHOLOGY EXAM Thiago Villavicencio  MD Juan David 1/5/2018 10:17 AM    C : RIGHT PARATRACHEAL LYMPH NODE Tissue Lymph Node, Paratracheal Lower (4R) SURGICAL PATHOLOGY EXAM Thiago Villavicencio MD 1/5/2018 10:20 AM    D : RIGHT LOWER LOBE - LUNG Tissue Lung, Right Lower Lobe SURGICAL PATHOLOGY EXAM Thiago Villavicencio MD 1/5/2018 10:45 AM       Additional operative procedures:   In the PACU, the patient was found to have significant chest tube output which was bloody.  Vital signs were stable, but based on the findings and the ongoing output of bloody drainage elected to proceed with reexploration. Therefore, patient was taken from the PACU back to the OR for the following procedures: Redo right thoracotomy with evacuation of hemothorax and hemostasis.        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 Visceral pleural invasion was present. Surgical margins negative. Subcarinal lymph node positive for metastasis. Final pathologic stage [id number], Final stage IIIA.    Unfortunately, on postoperative day three, the patient apparently became lightheaded while on the toilet and collapsed with a complete loss of pulse when she attempted to walk back to bed. Code blue called by staff, CPR started and ACLS continued for >30 minutes. Unsuccessful.     Cause of death: s/p right lower lobectomy for Stage IIIA NSCLC.      Consultations This Hospital Stay   HOSPITALIST IP CONSULT  PHYSICAL THERAPY ADULT IP CONSULT    Sincerely,    Dr. Thiago Villavicencio MD    Death Summary Prepared by: Jennifer Rodriguez PA-C      CC  Patient Care Team:  Marshal Mandujano PCP - General (Family Practice)

## (undated) DEVICE — TUBING SUCTION MEDI-VAC SOFT 3/16"X20' N520A

## (undated) DEVICE — ESU ELEC BLADE 6" COATED/INSULATED E1455-6

## (undated) DEVICE — STPL ENDO ARTICULATING 60MM EC60A

## (undated) DEVICE — DRAIN CHEST TUBE 28FR STR 8028

## (undated) DEVICE — DRSG STERI STRIP 1/2X4" R1547

## (undated) DEVICE — SU VICRYL 2-0 CT-1 27" J339H

## (undated) DEVICE — SU NDL CUT REV MED 3/8 209014

## (undated) DEVICE — CLIP APPLIER 11.5" PREMIUM II 134053

## (undated) DEVICE — DRSG GAUZE 4X4" 3033

## (undated) DEVICE — SPONGE LAP 18X18" X8435

## (undated) DEVICE — LINEN TOWEL PACK X5 5464

## (undated) DEVICE — DRAPE BREAST/CHEST 29420

## (undated) DEVICE — CATH ON-Q PAIN SILVER SKR 2.5" PM010-A

## (undated) DEVICE — SUCTION CANISTER MEDIVAC LINER 3000ML W/LID 65651-530

## (undated) DEVICE — SU PROLENE 4-0 RB-1DA 36" 8557H

## (undated) DEVICE — BLADE KNIFE SURG 10 371110

## (undated) DEVICE — SYR BULB IRRIG 50ML LATEX FREE 0035280

## (undated) DEVICE — DRSG TEGADERM 4X4 3/4" 1626

## (undated) DEVICE — SOL NACL 0.9% IRRIG 1000ML BOTTLE 07138-09

## (undated) DEVICE — SU SILK 2 REEL 60" SA8H

## (undated) DEVICE — STPL POWERED ECHELON VASC 35MM PVE35A

## (undated) DEVICE — PACK MINOR SBA15MIFSE

## (undated) DEVICE — NDL 22GA 1.5"

## (undated) DEVICE — Device

## (undated) DEVICE — SU VICRYL 1 CT 36" J959H

## (undated) DEVICE — TUBING SUCTION 12"X1/4" N612

## (undated) DEVICE — SU SILK 2-0 TIE 24" SA75H

## (undated) DEVICE — SU VICRYL 1 CTX CR 8X18" J765D

## (undated) DEVICE — SU PROLENE 3-0 V-7DA 36" 8976H

## (undated) DEVICE — GLOVE PROTEXIS W/NEU-THERA 7.5  2D73TE75

## (undated) DEVICE — DRAPE IOBAN INCISE 23X17" 6650EZ

## (undated) DEVICE — DRAPE POUCH INSTRUMENT 1018

## (undated) DEVICE — ADPT 5 IN 1 360

## (undated) DEVICE — STPL RELOAD REG/THK TISSUE ECHELON 60 X 3.8MM GOLD GST60D

## (undated) DEVICE — DRAIN PENROSE 0.75"X18" LATEX FREE GR205

## (undated) DEVICE — SURGICEL HEMOSTAT 3X4" NUKNIT 1943

## (undated) DEVICE — SUCTION DRY CHEST DRAIN OASIS 3600-100

## (undated) DEVICE — BLADE KNIFE SURG 15 371115

## (undated) DEVICE — ESU GROUND PAD UNIVERSAL W/O CORD

## (undated) DEVICE — GLOVE PROTEXIS W/NEU-THERA 6.5  2D73TE65

## (undated) DEVICE — STPL SKIN SUBCUTICULAR INSORB  2030

## (undated) DEVICE — PREP CHLORAPREP 26ML TINTED ORANGE  260815

## (undated) DEVICE — BLADE CLIPPER 4406

## (undated) DEVICE — DRSG KERLIX 4 1/2"X4YDS ROLL 6715

## (undated) DEVICE — GOWN IMPERVIOUS ZONED LG

## (undated) DEVICE — TAPE DRSG UNIVERSAL CLOTH 3" WHITE LATEX 881-3

## (undated) RX ORDER — ALBUMIN, HUMAN INJ 5% 5 %
SOLUTION INTRAVENOUS
Status: DISPENSED
Start: 2018-01-01

## (undated) RX ORDER — IPRATROPIUM BROMIDE AND ALBUTEROL SULFATE 2.5; .5 MG/3ML; MG/3ML
SOLUTION RESPIRATORY (INHALATION)
Status: DISPENSED
Start: 2018-01-01

## (undated) RX ORDER — LIDOCAINE HYDROCHLORIDE 20 MG/ML
INJECTION, SOLUTION EPIDURAL; INFILTRATION; INTRACAUDAL; PERINEURAL
Status: DISPENSED
Start: 2018-01-01

## (undated) RX ORDER — FENTANYL CITRATE 50 UG/ML
INJECTION, SOLUTION INTRAMUSCULAR; INTRAVENOUS
Status: DISPENSED
Start: 2018-01-01

## (undated) RX ORDER — PROPOFOL 10 MG/ML
INJECTION, EMULSION INTRAVENOUS
Status: DISPENSED
Start: 2018-01-01

## (undated) RX ORDER — BUPIVACAINE HYDROCHLORIDE 5 MG/ML
INJECTION, SOLUTION EPIDURAL; INTRACAUDAL
Status: DISPENSED
Start: 2018-01-01

## (undated) RX ORDER — CEFAZOLIN SODIUM 1 G/3ML
INJECTION, POWDER, FOR SOLUTION INTRAMUSCULAR; INTRAVENOUS
Status: DISPENSED
Start: 2018-01-01

## (undated) RX ORDER — HYDROMORPHONE HYDROCHLORIDE 1 MG/ML
INJECTION, SOLUTION INTRAMUSCULAR; INTRAVENOUS; SUBCUTANEOUS
Status: DISPENSED
Start: 2018-01-01

## (undated) RX ORDER — ALBUTEROL SULFATE 90 UG/1
AEROSOL, METERED RESPIRATORY (INHALATION)
Status: DISPENSED
Start: 2018-01-01

## (undated) RX ORDER — DIPHENHYDRAMINE HYDROCHLORIDE 50 MG/ML
INJECTION INTRAMUSCULAR; INTRAVENOUS
Status: DISPENSED
Start: 2018-01-01